# Patient Record
Sex: FEMALE | Race: BLACK OR AFRICAN AMERICAN | NOT HISPANIC OR LATINO | Employment: OTHER | URBAN - NONMETROPOLITAN AREA
[De-identification: names, ages, dates, MRNs, and addresses within clinical notes are randomized per-mention and may not be internally consistent; named-entity substitution may affect disease eponyms.]

---

## 2018-05-01 ENCOUNTER — HISTORICAL (OUTPATIENT)
Dept: ADMINISTRATIVE | Facility: HOSPITAL | Age: 73
End: 2018-05-01

## 2018-06-07 ENCOUNTER — HISTORICAL (OUTPATIENT)
Dept: ADMINISTRATIVE | Facility: HOSPITAL | Age: 73
End: 2018-06-07

## 2018-11-15 ENCOUNTER — HISTORICAL (OUTPATIENT)
Dept: ADMINISTRATIVE | Facility: HOSPITAL | Age: 73
End: 2018-11-15

## 2019-05-22 ENCOUNTER — HISTORICAL (OUTPATIENT)
Dept: ADMINISTRATIVE | Facility: HOSPITAL | Age: 74
End: 2019-05-22

## 2019-07-16 ENCOUNTER — HISTORICAL (OUTPATIENT)
Dept: ADMINISTRATIVE | Facility: HOSPITAL | Age: 74
End: 2019-07-16

## 2019-11-19 ENCOUNTER — HISTORICAL (OUTPATIENT)
Dept: ADMINISTRATIVE | Facility: HOSPITAL | Age: 74
End: 2019-11-19

## 2020-11-30 ENCOUNTER — HISTORICAL (OUTPATIENT)
Dept: ADMINISTRATIVE | Facility: HOSPITAL | Age: 75
End: 2020-11-30

## 2020-12-06 ENCOUNTER — HISTORICAL (OUTPATIENT)
Dept: ADMINISTRATIVE | Facility: HOSPITAL | Age: 75
End: 2020-12-06

## 2020-12-09 ENCOUNTER — HISTORICAL (OUTPATIENT)
Dept: ADMINISTRATIVE | Facility: HOSPITAL | Age: 75
End: 2020-12-09

## 2021-07-27 ENCOUNTER — HISTORICAL (OUTPATIENT)
Dept: ADMINISTRATIVE | Facility: HOSPITAL | Age: 76
End: 2021-07-27

## 2021-07-30 ENCOUNTER — HISTORICAL (OUTPATIENT)
Dept: ADMINISTRATIVE | Facility: HOSPITAL | Age: 76
End: 2021-07-30

## 2022-04-11 ENCOUNTER — HISTORICAL (OUTPATIENT)
Dept: ADMINISTRATIVE | Facility: HOSPITAL | Age: 77
End: 2022-04-11

## 2022-04-29 VITALS
SYSTOLIC BLOOD PRESSURE: 132 MMHG | DIASTOLIC BLOOD PRESSURE: 78 MMHG | HEIGHT: 64 IN | WEIGHT: 192.44 LBS | BODY MASS INDEX: 32.85 KG/M2

## 2022-05-07 ENCOUNTER — HISTORICAL (OUTPATIENT)
Dept: ADMINISTRATIVE | Facility: HOSPITAL | Age: 77
End: 2022-05-07

## 2022-09-14 ENCOUNTER — HISTORICAL (OUTPATIENT)
Dept: ADMINISTRATIVE | Facility: HOSPITAL | Age: 77
End: 2022-09-14

## 2023-04-21 ENCOUNTER — HOSPITAL ENCOUNTER (OUTPATIENT)
Dept: RADIOLOGY | Facility: HOSPITAL | Age: 78
Discharge: HOME | End: 2023-04-21
Attending: INTERNAL MEDICINE
Payer: MEDICARE

## 2023-04-21 DIAGNOSIS — M79.629: ICD-10-CM

## 2023-04-21 PROCEDURE — 76881 US COMPL JOINT R-T W/IMG: CPT | Mod: TC,RT

## 2023-06-08 DIAGNOSIS — D50.9 IRON DEFICIENCY ANEMIA, UNSPECIFIED IRON DEFICIENCY ANEMIA TYPE: Primary | ICD-10-CM

## 2023-06-08 RX ORDER — SODIUM CHLORIDE 0.9 % (FLUSH) 0.9 %
10 SYRINGE (ML) INJECTION
Status: CANCELLED | OUTPATIENT
Start: 2023-06-08

## 2023-06-12 DIAGNOSIS — D50.9 IRON DEFICIENCY ANEMIA, UNSPECIFIED IRON DEFICIENCY ANEMIA TYPE: Primary | ICD-10-CM

## 2023-06-12 RX ORDER — HEPARIN 100 UNIT/ML
500 SYRINGE INTRAVENOUS
Status: CANCELLED | OUTPATIENT
Start: 2023-06-12

## 2023-06-12 RX ORDER — SODIUM CHLORIDE 0.9 % (FLUSH) 0.9 %
10 SYRINGE (ML) INJECTION
Status: CANCELLED | OUTPATIENT
Start: 2023-06-12

## 2023-06-15 ENCOUNTER — INFUSION (OUTPATIENT)
Dept: INFUSION THERAPY | Facility: HOSPITAL | Age: 78
End: 2023-06-15
Attending: INTERNAL MEDICINE
Payer: MEDICARE

## 2023-06-15 VITALS
RESPIRATION RATE: 20 BRPM | WEIGHT: 192.44 LBS | BODY MASS INDEX: 32.85 KG/M2 | OXYGEN SATURATION: 96 % | DIASTOLIC BLOOD PRESSURE: 68 MMHG | HEART RATE: 87 BPM | HEIGHT: 64 IN | TEMPERATURE: 96 F | SYSTOLIC BLOOD PRESSURE: 128 MMHG

## 2023-06-15 DIAGNOSIS — D50.9 IRON DEFICIENCY ANEMIA, UNSPECIFIED IRON DEFICIENCY ANEMIA TYPE: Primary | ICD-10-CM

## 2023-06-15 PROCEDURE — 63600175 PHARM REV CODE 636 W HCPCS

## 2023-06-15 PROCEDURE — 25000003 PHARM REV CODE 250

## 2023-06-15 PROCEDURE — 96366 THER/PROPH/DIAG IV INF ADDON: CPT

## 2023-06-15 PROCEDURE — 96365 THER/PROPH/DIAG IV INF INIT: CPT

## 2023-06-15 RX ORDER — SODIUM CHLORIDE 0.9 % (FLUSH) 0.9 %
10 SYRINGE (ML) INJECTION
Status: CANCELLED | OUTPATIENT
Start: 2023-06-22

## 2023-06-15 RX ORDER — HEPARIN 100 UNIT/ML
500 SYRINGE INTRAVENOUS
Status: CANCELLED | OUTPATIENT
Start: 2023-06-22

## 2023-06-15 RX ORDER — SODIUM CHLORIDE 0.9 % (FLUSH) 0.9 %
10 SYRINGE (ML) INJECTION
Status: ACTIVE | OUTPATIENT
Start: 2023-06-15

## 2023-06-15 NOTE — PLAN OF CARE
INFUSION COMPLETE. IV D/C'D. PT INSTRUCTED TO CALL HER PMD WITH ANY QUESTIONS OR CONCERNS. PT VERBALIZED UNDERSTANDING.

## 2023-06-22 ENCOUNTER — INFUSION (OUTPATIENT)
Dept: INFUSION THERAPY | Facility: HOSPITAL | Age: 78
End: 2023-06-22
Attending: INTERNAL MEDICINE
Payer: MEDICARE

## 2023-06-22 VITALS
SYSTOLIC BLOOD PRESSURE: 122 MMHG | DIASTOLIC BLOOD PRESSURE: 65 MMHG | TEMPERATURE: 98 F | OXYGEN SATURATION: 97 % | RESPIRATION RATE: 20 BRPM | HEART RATE: 81 BPM

## 2023-06-22 DIAGNOSIS — D50.9 IRON DEFICIENCY ANEMIA, UNSPECIFIED IRON DEFICIENCY ANEMIA TYPE: Primary | ICD-10-CM

## 2023-06-22 PROCEDURE — A4216 STERILE WATER/SALINE, 10 ML: HCPCS | Performed by: INTERNAL MEDICINE

## 2023-06-22 PROCEDURE — 63600175 PHARM REV CODE 636 W HCPCS: Performed by: INTERNAL MEDICINE

## 2023-06-22 PROCEDURE — 96374 THER/PROPH/DIAG INJ IV PUSH: CPT

## 2023-06-22 PROCEDURE — 25000003 PHARM REV CODE 250: Performed by: INTERNAL MEDICINE

## 2023-06-22 RX ORDER — HEPARIN 100 UNIT/ML
500 SYRINGE INTRAVENOUS
Status: ACTIVE | OUTPATIENT
Start: 2023-06-22

## 2023-06-22 RX ORDER — SODIUM CHLORIDE 0.9 % (FLUSH) 0.9 %
10 SYRINGE (ML) INJECTION
Status: ACTIVE | OUTPATIENT
Start: 2023-06-22

## 2023-06-22 RX ORDER — HEPARIN 100 UNIT/ML
500 SYRINGE INTRAVENOUS
Status: CANCELLED | OUTPATIENT
Start: 2023-06-29

## 2023-06-22 RX ORDER — SODIUM CHLORIDE 0.9 % (FLUSH) 0.9 %
10 SYRINGE (ML) INJECTION
Status: CANCELLED | OUTPATIENT
Start: 2023-06-29

## 2023-06-22 RX ADMIN — IRON SUCROSE 200 MG: 20 INJECTION, SOLUTION INTRAVENOUS at 10:06

## 2023-06-22 RX ADMIN — Medication 10 ML: at 10:06

## 2023-06-22 NOTE — PLAN OF CARE
PT IN ROOM IN STABLE CONDITION, IRON SUCROSE 200MG IVP OVER 10 MINUTES GIVEN TO LT AC IV. PT TOLERATED WELL. PT STATES THAT THEY HAVE RECEIVED THIS MEDICATION IN THE PAST WITHOUT SIGNS AND SYMPTOMS OF A REACTION. INSTRUCTED PATIENT TO CALL PCP IF NEEDED.

## 2023-06-27 ENCOUNTER — HOSPITAL ENCOUNTER (EMERGENCY)
Facility: HOSPITAL | Age: 78
Discharge: HOME | End: 2023-06-27
Attending: FAMILY MEDICINE
Payer: MEDICARE

## 2023-06-27 VITALS
BODY MASS INDEX: 26.66 KG/M2 | HEART RATE: 82 BPM | WEIGHT: 160 LBS | OXYGEN SATURATION: 99 % | RESPIRATION RATE: 20 BRPM | DIASTOLIC BLOOD PRESSURE: 70 MMHG | TEMPERATURE: 98 F | HEIGHT: 65 IN | SYSTOLIC BLOOD PRESSURE: 147 MMHG

## 2023-06-27 DIAGNOSIS — R07.9 CHEST PAIN: ICD-10-CM

## 2023-06-27 DIAGNOSIS — M79.602 PAIN OF LEFT UPPER EXTREMITY: Primary | ICD-10-CM

## 2023-06-27 DIAGNOSIS — R52 PAIN: ICD-10-CM

## 2023-06-27 DIAGNOSIS — R53.1 WEAK: ICD-10-CM

## 2023-06-27 LAB
ALBUMIN SERPL-MCNC: 4.3 G/DL (ref 3.4–5)
ALBUMIN/GLOB SERPL: 1.4 RATIO
ALP SERPL-CCNC: 62 UNIT/L (ref 50–144)
ALT SERPL-CCNC: 43 UNIT/L (ref 1–45)
ANION GAP SERPL CALC-SCNC: 7 MEQ/L (ref 2–13)
AST SERPL-CCNC: 72 UNIT/L (ref 14–36)
BASOPHILS # BLD AUTO: 0.04 X10(3)/MCL (ref 0.01–0.08)
BASOPHILS NFR BLD AUTO: 0.7 % (ref 0.1–1.2)
BILIRUBIN DIRECT+TOT PNL SERPL-MCNC: 0.3 MG/DL (ref 0–1)
BUN SERPL-MCNC: 33 MG/DL (ref 7–20)
CALCIUM SERPL-MCNC: 8.8 MG/DL (ref 8.4–10.2)
CHLORIDE SERPL-SCNC: 105 MMOL/L (ref 98–110)
CK MB SERPL-MCNC: 0.86 NG/ML (ref 0–3.38)
CK SERPL-CCNC: 137 U/L (ref 30–135)
CO2 SERPL-SCNC: 24 MMOL/L (ref 21–32)
CREAT SERPL-MCNC: 1.42 MG/DL (ref 0.66–1.25)
CREAT/UREA NIT SERPL: 23 (ref 12–20)
EOSINOPHIL # BLD AUTO: 0.07 X10(3)/MCL (ref 0.04–0.36)
EOSINOPHIL NFR BLD AUTO: 1.2 % (ref 0.7–7)
ERYTHROCYTE [DISTWIDTH] IN BLOOD BY AUTOMATED COUNT: 13.5 % (ref 11–14.5)
GFR SERPLBLD CREATININE-BSD FMLA CKD-EPI: 39 MLS/MIN/1.73/M2
GLOBULIN SER-MCNC: 3.1 GM/DL (ref 2–3.9)
GLUCOSE SERPL-MCNC: 279 MG/DL (ref 70–115)
HCT VFR BLD AUTO: 27.6 % (ref 36–48)
HGB BLD-MCNC: 8.5 G/DL (ref 11.8–16)
IMM GRANULOCYTES # BLD AUTO: 0.02 X10(3)/MCL (ref 0–0.03)
IMM GRANULOCYTES NFR BLD AUTO: 0.3 % (ref 0–0.5)
LYMPHOCYTES # BLD AUTO: 1.58 X10(3)/MCL (ref 1.16–3.74)
LYMPHOCYTES NFR BLD AUTO: 27.1 % (ref 20–55)
MCH RBC QN AUTO: 28.8 PG (ref 27–34)
MCHC RBC AUTO-ENTMCNC: 30.8 G/DL (ref 31–37)
MCV RBC AUTO: 93.6 FL (ref 79–99)
MONOCYTES # BLD AUTO: 0.45 X10(3)/MCL (ref 0.24–0.36)
MONOCYTES NFR BLD AUTO: 7.7 % (ref 4.7–12.5)
NEUTROPHILS # BLD AUTO: 3.66 X10(3)/MCL (ref 1.56–6.13)
NEUTROPHILS NFR BLD AUTO: 63 % (ref 37–73)
NRBC BLD AUTO-RTO: 0 %
PLATELET # BLD AUTO: 202 X10(3)/MCL (ref 140–371)
PMV BLD AUTO: 10.6 FL (ref 9.4–12.4)
POTASSIUM SERPL-SCNC: 4.7 MMOL/L (ref 3.5–5.1)
PROT SERPL-MCNC: 7.4 GM/DL (ref 6.3–8.2)
RBC # BLD AUTO: 2.95 X10(6)/MCL (ref 4–5.1)
SODIUM SERPL-SCNC: 136 MMOL/L (ref 135–145)
TROPONIN I SERPL-MCNC: <0.012 NG/ML (ref 0–0.03)
WBC # SPEC AUTO: 5.82 X10(3)/MCL (ref 4–11.5)

## 2023-06-27 PROCEDURE — 99285 EMERGENCY DEPT VISIT HI MDM: CPT | Mod: 25

## 2023-06-27 PROCEDURE — 85025 COMPLETE CBC W/AUTO DIFF WBC: CPT | Performed by: FAMILY MEDICINE

## 2023-06-27 PROCEDURE — 82553 CREATINE MB FRACTION: CPT | Performed by: FAMILY MEDICINE

## 2023-06-27 PROCEDURE — 36415 COLL VENOUS BLD VENIPUNCTURE: CPT | Performed by: FAMILY MEDICINE

## 2023-06-27 PROCEDURE — 84484 ASSAY OF TROPONIN QUANT: CPT | Performed by: FAMILY MEDICINE

## 2023-06-27 PROCEDURE — 93005 ELECTROCARDIOGRAM TRACING: CPT

## 2023-06-27 PROCEDURE — 25000003 PHARM REV CODE 250: Performed by: FAMILY MEDICINE

## 2023-06-27 PROCEDURE — 82550 ASSAY OF CK (CPK): CPT | Performed by: FAMILY MEDICINE

## 2023-06-27 PROCEDURE — 80053 COMPREHEN METABOLIC PANEL: CPT | Performed by: FAMILY MEDICINE

## 2023-06-27 RX ORDER — IBUPROFEN 400 MG/1
400 TABLET ORAL
Status: COMPLETED | OUTPATIENT
Start: 2023-06-27 | End: 2023-06-27

## 2023-06-27 RX ADMIN — IBUPROFEN 400 MG: 400 TABLET ORAL at 01:06

## 2023-06-27 NOTE — ED PROVIDER NOTES
Encounter Date: 6/27/2023       History     Chief Complaint   Patient presents with    Arm Pain     AMB TO ED WITH C/O LEFT ARM PAIN /PRESSURE SINCE YESTERDAY. SHE HAS SOME FACIAL ASYMMETRY TO HER FACE AND SHE HAS A HX OF A CVA X 5YRS AGO. THE PT LOOKED IN THE MIRROR AND DOES NOT SEE ANY CHANGES.      Patient presents to the emergency room with around a 1 day history of some aches in her left upper arm.  It has been constant, but the patient notices maybe worse with movement.  She denies any chest pains or feeling bad, but she is not sure if she twisted it or bruised it.  She called her PCP and was referred to the emergency room.  Patient denies any chest pains fevers chills etc. Nursing triage noted that patient had a slight facial droop on the left side.  Patient says this is normal for her since a stroke 5 years ago.    The history is provided by the patient.   Review of patient's allergies indicates:  No Known Allergies  Past Medical History:   Diagnosis Date    Cancer     Diabetes mellitus     High cholesterol     Hypertension     Stroke      Past Surgical History:   Procedure Laterality Date    BREAST SURGERY      COLON SURGERY      COLONOSCOPY  10/05/2016     History reviewed. No pertinent family history.  Social History     Tobacco Use    Smoking status: Former     Types: Cigarettes    Smokeless tobacco: Never   Substance Use Topics    Alcohol use: Never     Review of Systems   Constitutional:  Negative for fever.   HENT:  Negative for sore throat.    Respiratory:  Negative for shortness of breath.    Cardiovascular:  Negative for chest pain.   Gastrointestinal:  Negative for nausea.   Genitourinary:  Negative for dysuria.   Musculoskeletal:  Negative for back pain.   Skin:  Negative for rash.   Neurological:  Negative for weakness.   Hematological:  Does not bruise/bleed easily.   All other systems reviewed and are negative.    Physical Exam     Initial Vitals [06/27/23 1143]   BP Pulse Resp Temp SpO2    137/60 99 20 98.4 °F (36.9 °C) 100 %      MAP       --         Physical Exam    Nursing note and vitals reviewed.  Constitutional: She appears well-developed and well-nourished.   HENT:   Head: Normocephalic and atraumatic.   Eyes: EOM are normal. Pupils are equal, round, and reactive to light.   Neck: Neck supple.   Normal range of motion.  Cardiovascular:  Normal rate, regular rhythm and normal heart sounds.           Pulmonary/Chest: Breath sounds normal.   Abdominal: Abdomen is soft. Bowel sounds are normal. There is no abdominal tenderness.   Musculoskeletal:         General: No edema. Normal range of motion.      Cervical back: Normal range of motion and neck supple.     Neurological: She is alert and oriented to person, place, and time. She has normal strength and normal reflexes. She displays normal reflexes. No sensory deficit. GCS score is 15. GCS eye subscore is 4. GCS verbal subscore is 5. GCS motor subscore is 6.   Patient does have a slight left-sided facial droop noted primarily around maxillary area, but it was very subtle   Skin: Skin is warm and dry. Capillary refill takes less than 2 seconds.   Psychiatric: She has a normal mood and affect.     ED Course   Procedures  Labs Reviewed   CK - Abnormal; Notable for the following components:       Result Value    Creatine Kinase 137 (*)     All other components within normal limits   COMPREHENSIVE METABOLIC PANEL - Abnormal; Notable for the following components:    Glucose Level 279 (*)     Blood Urea Nitrogen 33.0 (*)     Creatinine 1.42 (*)     Aspartate Aminotransferase 72 (*)     BUN/Creatinine Ratio 23 (*)     All other components within normal limits   CBC WITH DIFFERENTIAL - Abnormal; Notable for the following components:    RBC 2.95 (*)     Hgb 8.5 (*)     Hct 27.6 (*)     MCHC 30.8 (*)     Mono # 0.45 (*)     All other components within normal limits   TROPONIN I - Normal   CK-MB - Normal   CBC W/ AUTO DIFFERENTIAL    Narrative:     The  following orders were created for panel order CBC Auto Differential.  Procedure                               Abnormality         Status                     ---------                               -----------         ------                     CBC with Differential[[id number]]        Abnormal            Final result                 Please view results for these tests on the individual orders.     EKG Readings: (Independently Interpreted)   Initial Reading: No STEMI. Rhythm: Normal Sinus Rhythm. Ectopy: No Ectopy. Conduction: Normal. ST Segments: Normal ST Segments. T Waves: Normal. Clinical Impression: Normal Sinus Rhythm     Imaging Results              X-Ray Chest AP Portable (Final result)  Result time 06/27/23 13:34:05      Final result by Chava Jimenez III, MD (06/27/23 13:34:05)                   Impression:      1. Chronic changes are present as described above.  See above comments.  2. I see no lobar or segmental infiltrates or other significant abnormalities.      Electronically signed by: Chava Jimenez  Date:    06/27/2023  Time:    13:34               Narrative:    EXAMINATION:  STUDY: XR CHEST AP PORTABLE    CLINICAL HISTORY AND TECHNIQUE:  RT Bill on 6/27/2023  1:27 PM    CURRENT CLINICAL HISTORY: ER PT    X 1 DAY    -LEFT UPPER ARM PAIN/PRESSURE    -WEAKNESS    PAST MEDICAL HISTORY: CVA, FORMER SMOKER, HTN, DIABETES    TECHNIQUE: 1 VIEW CHEST PORTABLE    TECHNOLOGIST: HUMZA/MD/NN    COMPARISON:  12/09/2020    FINDINGS:  The lungs are slightly under expanded.  Postoperative changes are noted within the right axilla and breast.The cardiac, hilar, and mediastinal contours appear unremarkable.I see no lobar or segmental infiltrates.No significant pleural effusions are noted.There is moderate demineralization of the skeletal structures with moderate degenerative changes noted involving both glenohumeral joints and to a lesser extent the thoracic spine.                                       X-Ray Humerus  2 View Left (Final result)  Result time 06/27/23 13:35:19      Final result by Chava Jimenez III, MD (06/27/23 13:35:19)                   Impression:      1. Chronic changes are present as described above.  See above comments.      Electronically signed by: Chava Jimenez  Date:    06/27/2023  Time:    13:35               Narrative:    EXAMINATION:  STUDY: XR HUMERUS 2 VIEW LEFT    CLINICAL HISTORY AND TECHNIQUE:  Pancho Urban RT on 6/27/2023  1:27 PM    CURRENT CLINICAL HISTORY: ER PT    X 1 DAY    -LEFT UPPER ARM PAIN/PRESSURE    -WEAKNESS    PAST MEDICAL HISTORY: CVA, FORMER SMOKER, HTN, DIABETES    TECHNIQUE: 2 VIEWS OF LEFT HUMERUS    TECHNOLOGIST: HUMZA/MD/NN    COMPARISON:  None    FINDINGS:  There is moderate demineralization of the skeletal structures with fairly extensive degenerative changes noted involving the left acromioclavicular joint and to a lesser extent the left glenohumeral joint.  I see no acute fractures or other significant abnormalities.                                    X-Rays:   Independently Interpreted Readings:   Other Readings:  Left arm: no acute    CXR: no acute  Medications   ibuprofen tablet 400 mg (400 mg Oral Given 6/27/23 1301)     Medical Decision Making:   Initial Assessment:   Left arm ache  Differential Diagnosis:   ACS, muscular strain  Clinical Tests:   Lab Tests: Ordered and Reviewed  The following lab test(s) were unremarkable: CBC, CMP and Troponin  Radiological Study: Ordered and Reviewed  Medical Tests: Ordered and Reviewed  ED Management:  EKG normal, x-rays are normal, lab work all normal    Most likely diagnosis is musculoskeletal pain/arthritis we will discharge                        Clinical Impression:   Final diagnoses:  [R07.9] Chest pain  [R53.1] Weak  [R52] Pain  [M79.602] Pain of left upper extremity (Primary)        ED Disposition Condition    Discharge Stable          ED Prescriptions    None       Follow-up Information       Follow up With Specialties  Details Why Contact Info    Sylvain Ackerman MD Internal Medicine, General Practice  If symptoms worsen [address] [facility] [address]  [facility] [id number] [phone]               Michael Lee MD  06/27/23 0210

## 2023-06-27 NOTE — DISCHARGE INSTRUCTIONS
Take Tylenol or Motrin as needed for aches and pains.  Feel free to return to the emergency room with chest pains weakness worsening of pain.

## 2023-06-29 ENCOUNTER — INFUSION (OUTPATIENT)
Dept: INFUSION THERAPY | Facility: HOSPITAL | Age: 78
End: 2023-06-29
Attending: INTERNAL MEDICINE
Payer: MEDICAID

## 2023-06-29 VITALS
OXYGEN SATURATION: 97 % | RESPIRATION RATE: 18 BRPM | HEART RATE: 80 BPM | TEMPERATURE: 98 F | DIASTOLIC BLOOD PRESSURE: 78 MMHG | SYSTOLIC BLOOD PRESSURE: 132 MMHG

## 2023-06-29 DIAGNOSIS — D50.9 IRON DEFICIENCY ANEMIA, UNSPECIFIED IRON DEFICIENCY ANEMIA TYPE: Primary | ICD-10-CM

## 2023-06-29 PROCEDURE — 96374 THER/PROPH/DIAG INJ IV PUSH: CPT

## 2023-06-29 PROCEDURE — 25000003 PHARM REV CODE 250: Performed by: INTERNAL MEDICINE

## 2023-06-29 PROCEDURE — 63600175 PHARM REV CODE 636 W HCPCS: Performed by: INTERNAL MEDICINE

## 2023-06-29 PROCEDURE — 96376 TX/PRO/DX INJ SAME DRUG ADON: CPT

## 2023-06-29 PROCEDURE — A4216 STERILE WATER/SALINE, 10 ML: HCPCS | Performed by: INTERNAL MEDICINE

## 2023-06-29 RX ORDER — SODIUM CHLORIDE 0.9 % (FLUSH) 0.9 %
10 SYRINGE (ML) INJECTION
Status: CANCELLED | OUTPATIENT
Start: 2023-07-06

## 2023-06-29 RX ORDER — SODIUM CHLORIDE 0.9 % (FLUSH) 0.9 %
10 SYRINGE (ML) INJECTION
Status: ACTIVE | OUTPATIENT
Start: 2023-06-29

## 2023-06-29 RX ORDER — HEPARIN 100 UNIT/ML
500 SYRINGE INTRAVENOUS
Status: CANCELLED | OUTPATIENT
Start: 2023-07-06

## 2023-06-29 RX ADMIN — Medication 10 ML: at 09:06

## 2023-06-29 RX ADMIN — IRON SUCROSE 200 MG: 20 INJECTION, SOLUTION INTRAVENOUS at 09:06

## 2023-06-29 NOTE — PLAN OF CARE
PT IN ROOM IN STABLE CONDITION, VENOFER  GIVEN VIA IVP OVER 10 MINUTES AS INSTRUCTED PER PHARMACY.  IV FLUSHED WITH NS 20MLS, IV D/C'D WITH CATHETER TIP INTACT, APPLIED PRESSURE THEN COVERED WITH BAND-AID. PT TOLERATED WELL. INSTRUCTED PATIENT TO CALL PCP IF NEEDED.

## 2023-07-06 ENCOUNTER — INFUSION (OUTPATIENT)
Dept: INFUSION THERAPY | Facility: HOSPITAL | Age: 78
End: 2023-07-06
Attending: INTERNAL MEDICINE
Payer: MEDICARE

## 2023-07-06 VITALS
RESPIRATION RATE: 20 BRPM | SYSTOLIC BLOOD PRESSURE: 139 MMHG | OXYGEN SATURATION: 100 % | DIASTOLIC BLOOD PRESSURE: 68 MMHG | TEMPERATURE: 98 F | HEART RATE: 79 BPM

## 2023-07-06 DIAGNOSIS — D50.9 IRON DEFICIENCY ANEMIA, UNSPECIFIED IRON DEFICIENCY ANEMIA TYPE: Primary | ICD-10-CM

## 2023-07-06 PROCEDURE — A4216 STERILE WATER/SALINE, 10 ML: HCPCS | Performed by: INTERNAL MEDICINE

## 2023-07-06 PROCEDURE — 63600175 PHARM REV CODE 636 W HCPCS: Performed by: INTERNAL MEDICINE

## 2023-07-06 PROCEDURE — 25000003 PHARM REV CODE 250: Performed by: INTERNAL MEDICINE

## 2023-07-06 PROCEDURE — 96374 THER/PROPH/DIAG INJ IV PUSH: CPT

## 2023-07-06 RX ORDER — SODIUM CHLORIDE 0.9 % (FLUSH) 0.9 %
10 SYRINGE (ML) INJECTION
Status: ACTIVE | OUTPATIENT
Start: 2023-07-06

## 2023-07-06 RX ORDER — SODIUM CHLORIDE 0.9 % (FLUSH) 0.9 %
10 SYRINGE (ML) INJECTION
Status: CANCELLED | OUTPATIENT
Start: 2023-07-13

## 2023-07-06 RX ORDER — HEPARIN 100 UNIT/ML
500 SYRINGE INTRAVENOUS
Status: CANCELLED | OUTPATIENT
Start: 2023-07-13

## 2023-07-06 RX ADMIN — Medication 10 ML: at 10:07

## 2023-07-06 RX ADMIN — IRON SUCROSE 200 MG: 20 INJECTION, SOLUTION INTRAVENOUS at 10:07

## 2023-07-06 NOTE — PLAN OF CARE
Patient tolerated iron well, No s/s of distress noted, Instructed patient to follow up with her physician as needed, Verbalized understanding, Patient discharged home in stable condition

## 2023-07-13 ENCOUNTER — INFUSION (OUTPATIENT)
Dept: INFUSION THERAPY | Facility: HOSPITAL | Age: 78
End: 2023-07-13
Attending: INTERNAL MEDICINE
Payer: MEDICAID

## 2023-07-13 VITALS
HEART RATE: 81 BPM | SYSTOLIC BLOOD PRESSURE: 156 MMHG | TEMPERATURE: 97 F | OXYGEN SATURATION: 100 % | RESPIRATION RATE: 20 BRPM | DIASTOLIC BLOOD PRESSURE: 61 MMHG

## 2023-07-13 DIAGNOSIS — D50.9 IRON DEFICIENCY ANEMIA, UNSPECIFIED IRON DEFICIENCY ANEMIA TYPE: Primary | ICD-10-CM

## 2023-07-13 PROCEDURE — 63600175 PHARM REV CODE 636 W HCPCS: Performed by: INTERNAL MEDICINE

## 2023-07-13 PROCEDURE — 96374 THER/PROPH/DIAG INJ IV PUSH: CPT

## 2023-07-13 RX ORDER — HEPARIN 100 UNIT/ML
500 SYRINGE INTRAVENOUS
OUTPATIENT
Start: 2023-07-20

## 2023-07-13 RX ORDER — HEPARIN 100 UNIT/ML
500 SYRINGE INTRAVENOUS
Status: ACTIVE | OUTPATIENT
Start: 2023-07-13

## 2023-07-13 RX ORDER — SODIUM CHLORIDE 0.9 % (FLUSH) 0.9 %
10 SYRINGE (ML) INJECTION
OUTPATIENT
Start: 2023-07-20

## 2023-07-13 RX ORDER — SODIUM CHLORIDE 0.9 % (FLUSH) 0.9 %
10 SYRINGE (ML) INJECTION
Status: ACTIVE | OUTPATIENT
Start: 2023-07-13

## 2023-07-13 RX ADMIN — IRON SUCROSE 200 MG: 20 INJECTION, SOLUTION INTRAVENOUS at 10:07

## 2023-07-13 NOTE — PLAN OF CARE
PT IN ROOM IN STABLE CONDITION, VENOFER GIVEN VIA IVP. IV FLUSHED WITH 20ML NS, IV D/C'D WITH CATHETER TIP INTACT, APPLIED PRESSURE THEN COVERED WITH BAND-AID. PT TOLERATED WELL. INSTRUCTED PATIENT TO CALL PCP IF NEEDED.

## 2023-07-18 ENCOUNTER — OFFICE VISIT (OUTPATIENT)
Dept: OBSTETRICS AND GYNECOLOGY | Facility: CLINIC | Age: 78
End: 2023-07-18
Payer: MEDICARE

## 2023-07-18 VITALS
TEMPERATURE: 98 F | BODY MASS INDEX: 28.89 KG/M2 | HEIGHT: 65 IN | WEIGHT: 173.38 LBS | DIASTOLIC BLOOD PRESSURE: 74 MMHG | SYSTOLIC BLOOD PRESSURE: 148 MMHG

## 2023-07-18 DIAGNOSIS — R32 URINARY INCONTINENCE, UNSPECIFIED TYPE: Primary | ICD-10-CM

## 2023-07-18 DIAGNOSIS — R35.1 NOCTURIA: ICD-10-CM

## 2023-07-18 DIAGNOSIS — N81.11 CYSTOCELE, MIDLINE: ICD-10-CM

## 2023-07-18 LAB
BILIRUB UR QL STRIP: NEGATIVE
GLUCOSE UR QL STRIP: NEGATIVE
KETONES UR QL STRIP: NEGATIVE
LEUKOCYTE ESTERASE UR QL STRIP: NEGATIVE
PH, POC UA: 5
POC BLOOD, URINE: NEGATIVE
POC NITRATES, URINE: NEGATIVE
PROT UR QL STRIP: POSITIVE
SP GR UR STRIP: 1.02 (ref 1–1.03)
UROBILINOGEN UR STRIP-ACNC: 0.2 (ref 0.1–1.1)

## 2023-07-18 PROCEDURE — 99213 PR OFFICE/OUTPT VISIT, EST, LEVL III, 20-29 MIN: ICD-10-PCS | Mod: ,,, | Performed by: OBSTETRICS & GYNECOLOGY

## 2023-07-18 PROCEDURE — 81003 URINALYSIS AUTO W/O SCOPE: CPT | Mod: QW,RHCUB | Performed by: OBSTETRICS & GYNECOLOGY

## 2023-07-18 PROCEDURE — 99213 OFFICE O/P EST LOW 20 MIN: CPT | Mod: ,,, | Performed by: OBSTETRICS & GYNECOLOGY

## 2023-07-18 PROCEDURE — 1159F PR MEDICATION LIST DOCUMENTED IN MEDICAL RECORD: ICD-10-PCS | Mod: ,,, | Performed by: OBSTETRICS & GYNECOLOGY

## 2023-07-18 PROCEDURE — 1159F MED LIST DOCD IN RCRD: CPT | Mod: ,,, | Performed by: OBSTETRICS & GYNECOLOGY

## 2023-07-18 PROCEDURE — 3077F SYST BP >= 140 MM HG: CPT | Mod: ,,, | Performed by: OBSTETRICS & GYNECOLOGY

## 2023-07-18 PROCEDURE — 1101F PR PT FALLS ASSESS DOC 0-1 FALLS W/OUT INJ PAST YR: ICD-10-PCS | Mod: ,,, | Performed by: OBSTETRICS & GYNECOLOGY

## 2023-07-18 PROCEDURE — 3078F PR MOST RECENT DIASTOLIC BLOOD PRESSURE < 80 MM HG: ICD-10-PCS | Mod: ,,, | Performed by: OBSTETRICS & GYNECOLOGY

## 2023-07-18 PROCEDURE — 1101F PT FALLS ASSESS-DOCD LE1/YR: CPT | Mod: ,,, | Performed by: OBSTETRICS & GYNECOLOGY

## 2023-07-18 PROCEDURE — 3078F DIAST BP <80 MM HG: CPT | Mod: ,,, | Performed by: OBSTETRICS & GYNECOLOGY

## 2023-07-18 PROCEDURE — 3288F FALL RISK ASSESSMENT DOCD: CPT | Mod: ,,, | Performed by: OBSTETRICS & GYNECOLOGY

## 2023-07-18 PROCEDURE — 3077F PR MOST RECENT SYSTOLIC BLOOD PRESSURE >= 140 MM HG: ICD-10-PCS | Mod: ,,, | Performed by: OBSTETRICS & GYNECOLOGY

## 2023-07-18 PROCEDURE — 3288F PR FALLS RISK ASSESSMENT DOCUMENTED: ICD-10-PCS | Mod: ,,, | Performed by: OBSTETRICS & GYNECOLOGY

## 2023-07-18 RX ORDER — HYDROCHLOROTHIAZIDE 25 MG/1
25 TABLET ORAL
COMMUNITY
Start: 2023-06-16

## 2023-07-18 RX ORDER — SUCRALFATE 1 G/1
1 TABLET ORAL
COMMUNITY

## 2023-07-18 RX ORDER — ESOMEPRAZOLE MAGNESIUM 40 MG/1
40 CAPSULE, DELAYED RELEASE ORAL 2 TIMES DAILY
COMMUNITY
Start: 2023-06-16

## 2023-07-18 RX ORDER — CALCIUM CITRATE/VITAMIN D3 200MG-6.25
TABLET ORAL
COMMUNITY
Start: 2023-02-28

## 2023-07-18 RX ORDER — PANTOPRAZOLE SODIUM 40 MG/1
40 TABLET, DELAYED RELEASE ORAL 2 TIMES DAILY
COMMUNITY
Start: 2023-05-23

## 2023-07-18 RX ORDER — PIOGLITAZONEHYDROCHLORIDE 45 MG/1
45 TABLET ORAL
COMMUNITY
Start: 2023-06-16

## 2023-07-18 RX ORDER — METFORMIN HYDROCHLORIDE 1000 MG/1
1000 TABLET ORAL 2 TIMES DAILY
COMMUNITY
Start: 2023-07-01

## 2023-07-18 RX ORDER — ROSUVASTATIN CALCIUM 40 MG/1
40 TABLET, COATED ORAL
COMMUNITY
Start: 2023-06-08

## 2023-07-18 RX ORDER — SOLIFENACIN SUCCINATE 5 MG/1
5 TABLET, FILM COATED ORAL DAILY
Qty: 30 TABLET | Refills: 11 | Status: SHIPPED | OUTPATIENT
Start: 2023-07-18 | End: 2024-07-17

## 2023-07-18 RX ORDER — GLIPIZIDE 10 MG/1
10 TABLET, FILM COATED, EXTENDED RELEASE ORAL 2 TIMES DAILY
COMMUNITY
Start: 2023-04-05

## 2023-07-18 RX ORDER — AMLODIPINE BESYLATE 10 MG/1
10 TABLET ORAL
COMMUNITY
Start: 2023-06-16

## 2023-07-18 RX ORDER — TRIAMCINOLONE ACETONIDE 1 MG/G
CREAM TOPICAL
COMMUNITY
Start: 2023-04-04

## 2023-07-18 RX ORDER — HYDROCODONE BITARTRATE AND ACETAMINOPHEN 10; 325 MG/1; MG/1
TABLET ORAL
COMMUNITY
Start: 2023-06-23

## 2023-07-18 NOTE — PROGRESS NOTES
Chief Complaint     Urinary Incontinence    HPI:     Patient is a 77 y.o.  s/p hysterectomy presents today with complaints of continued leakage of bladder, nocturia. No relief with Oxybutynin and minimal relief with Myrbetriq in past.  Still not using vaginal premarin. Denies sexually active. Reports no change with cystocele, denies pain.      MENOPAUSAL:  Age at menarche: 14  Age at menopause: MARY  Hysterectomy:  Yes  Last pap: MARY  H/o Abnormal Pap: No   Colposcopy: none  Postcoital Bleeding: No  Sexually Active: not currently   Dyspareunia: No  H/o STI: No   HRT: none  MM2022 L breast  H/o abnl MMG: hx of breast cancer. R mast    Past Medical History:   Diagnosis Date    Cancer     Diabetes mellitus     High cholesterol     Hypertension     Stroke        Past Surgical History:   Procedure Laterality Date    BREAST SURGERY      COLON SURGERY      COLONOSCOPY  10/05/2016    HYSTERECTOMY         History reviewed. No pertinent family history.    OB History          3    Para   3    Term   3            AB        Living   3         SAB        IAB        Ectopic        Multiple        Live Births   3                 Current Outpatient Medications on File Prior to Visit   Medication Sig Dispense Refill    amLODIPine (NORVASC) 10 MG tablet Take 10 mg by mouth.      esomeprazole (NEXIUM) 40 MG capsule Take 40 mg by mouth 2 (two) times daily.      glipiZIDE (GLUCOTROL) 10 MG TR24 Take 10 mg by mouth 2 (two) times daily.      hydroCHLOROthiazide (HYDRODIURIL) 25 MG tablet Take 25 mg by mouth.      HYDROcodone-acetaminophen (NORCO)  mg per tablet TAKE 1 TABLET 3 TIMES DAILY AS NEEDED FOR PAIN --MAY CAUSE DROWSINESS--      metFORMIN (GLUCOPHAGE) 1000 MG tablet Take 1,000 mg by mouth 2 (two) times daily.      pantoprazole (PROTONIX) 40 MG tablet Take 40 mg by mouth 2 (two) times daily.      pioglitazone (ACTOS) 45 MG tablet Take 45 mg by mouth.      rosuvastatin (CRESTOR) 40 MG Tab Take 40  mg by mouth.      sucralfate (CARAFATE) 1 gram tablet Take 1 g by mouth.      triamcinolone acetonide 0.1% (KENALOG) 0.1 % cream APPLY TO AFFECTED AREA TWO TIMES A DAY      TRUE METRIX GLUCOSE TEST STRIP Strp USE 1 STRIP TO TEST 3 TIMES A DAY AS DIRECTED       Current Facility-Administered Medications on File Prior to Visit   Medication Dose Route Frequency Provider Last Rate Last Admin    heparin, porcine (PF) 100 unit/mL injection flush 500 Units  500 Units Intravenous PRN Sylvain Ackerman MD        heparin, porcine (PF) 100 unit/mL injection flush 500 Units  500 Units Intravenous PRN Sylvain Ackerman MD        sodium chloride 0.9% 250 mL flush bag   Intravenous PRN Sylvain Ackerman MD        sodium chloride 0.9% 250 mL flush bag   Intravenous PRN Sylvain Ackerman MD        sodium chloride 0.9% 250 mL flush bag   Intravenous PRN Sylvain Ackerman MD        sodium chloride 0.9% 250 mL flush bag   Intravenous PRN Sylvain Ackerman MD        sodium chloride 0.9% 250 mL flush bag   Intravenous PRN Sylvain Ackerman MD        sodium chloride 0.9% flush 10 mL  10 mL Intravenous PRN Sylvain Ackerman MD        sodium chloride 0.9% flush 10 mL  10 mL Intravenous PRN Sylvain Ackerman MD   10 mL at 06/22/23 1028    sodium chloride 0.9% flush 10 mL  10 mL Intravenous PRN Sylvain Ackerman MD   10 mL at 06/29/23 0951    sodium chloride 0.9% flush 10 mL  10 mL Intravenous PRN Sylvain Ackerman MD   10 mL at 07/06/23 1007    sodium chloride 0.9% flush 10 mL  10 mL Intravenous PRN Sylvain Ackerman MD           Review of Systems:       Review of Systems   Constitutional:  Negative for chills and fever.   Gastrointestinal:  Negative for abdominal pain, constipation and diarrhea.   Genitourinary:  Positive for bladder incontinence, frequency, urgency and urinary incontinence. Negative for decreased libido, dysmenorrhea, dyspareunia, dysuria, flank pain, genital sores, hematuria, hot flashes, menorrhagia, menstrual problem, pelvic pain,  "vaginal bleeding, vaginal discharge, vaginal pain, postcoital bleeding, postmenopausal bleeding, vaginal dryness and vaginal odor.        Prolapse       Physical Exam:    BP (!) 148/74 (BP Location: Right arm)   Temp 97.5 °F (36.4 °C)   Ht 5' 5" (1.651 m)   Wt 78.7 kg (173 lb 6.4 oz)   BMI 28.86 kg/m²     Physical Exam     Deferred    Assessment:   1. Urinary incontinence, unspecified type  -     POCT Urinalysis, Dipstick, Automated, W/O Scope  -     solifenacin (VESICARE) 5 MG tablet; Take 1 tablet (5 mg total) by mouth once daily.  Dispense: 30 tablet; Refill: 11    2. Nocturia    3. Cystocele, midline             Plan:       Discussed treatment options including conservative measures, pessary insertion, surgical repair.   Strongly recommend use of vaginal premarin cream to help with atrophy incase she wants to pursue surgery or pessary in the future.  Discussed A&P repair procedure in detail with patient. Questions answered.     Says she wants to try another medication fo rurgency for now.   Will do trial of vesicare 5 mg daily      RTC 1 month for medication f/u    "

## 2023-07-25 PROBLEM — N81.11 CYSTOCELE, MIDLINE: Status: ACTIVE | Noted: 2023-07-25

## 2023-07-25 PROBLEM — R32 URINARY INCONTINENCE: Status: ACTIVE | Noted: 2023-07-25

## 2023-08-10 NOTE — PROGRESS NOTES
Chief Complaint     Follow-up (F/u on vesicare 5 mg. )    HPI:     Patient is a 77 y.o.  presents to follow up Vesicare 5mg for complaints of urinary incontinence, nocturia. She's had partial relief of symptoms with vesicare.     MENOPAUSAL:  Age at menarche: 14  Age at menopause: MARY  Hysterectomy:  Yes  Last pap: MARY  H/o Abnormal Pap: No   Colposcopy: none  Postcoital Bleeding: No  Sexually Active: not currently   Dyspareunia: No  H/o STI: No   HRT: none  MM2022 L breast  H/o abnl MMG: hx of breast cancer. R mastectomy   Colonoscopy: yes        Past Medical History:   Diagnosis Date    Cancer     Diabetes mellitus     High cholesterol     Hypertension     Stroke        Past Surgical History:   Procedure Laterality Date    BREAST SURGERY      COLON SURGERY      COLONOSCOPY  10/05/2016    HYSTERECTOMY         History reviewed. No pertinent family history.    OB History          3    Para   3    Term   3            AB        Living   3         SAB        IAB        Ectopic        Multiple        Live Births   3                 Current Outpatient Medications on File Prior to Visit   Medication Sig Dispense Refill    amLODIPine (NORVASC) 10 MG tablet Take 10 mg by mouth.      esomeprazole (NEXIUM) 40 MG capsule Take 40 mg by mouth 2 (two) times daily.      glipiZIDE (GLUCOTROL) 10 MG TR24 Take 10 mg by mouth 2 (two) times daily.      hydroCHLOROthiazide (HYDRODIURIL) 25 MG tablet Take 25 mg by mouth.      HYDROcodone-acetaminophen (NORCO)  mg per tablet TAKE 1 TABLET 3 TIMES DAILY AS NEEDED FOR PAIN --MAY CAUSE DROWSINESS--      metFORMIN (GLUCOPHAGE) 1000 MG tablet Take 1,000 mg by mouth 2 (two) times daily.      pantoprazole (PROTONIX) 40 MG tablet Take 40 mg by mouth 2 (two) times daily.      pioglitazone (ACTOS) 45 MG tablet Take 45 mg by mouth.      rosuvastatin (CRESTOR) 40 MG Tab Take 40 mg by mouth.      solifenacin (VESICARE) 5 MG tablet Take 1 tablet (5 mg total) by  mouth once daily. 30 tablet 11    sucralfate (CARAFATE) 1 gram tablet Take 1 g by mouth.      triamcinolone acetonide 0.1% (KENALOG) 0.1 % cream APPLY TO AFFECTED AREA TWO TIMES A DAY      TRUE METRIX GLUCOSE TEST STRIP Strp USE 1 STRIP TO TEST 3 TIMES A DAY AS DIRECTED       Current Facility-Administered Medications on File Prior to Visit   Medication Dose Route Frequency Provider Last Rate Last Admin    heparin, porcine (PF) 100 unit/mL injection flush 500 Units  500 Units Intravenous PRN Sylvain Ackerman MD        heparin, porcine (PF) 100 unit/mL injection flush 500 Units  500 Units Intravenous PRN Sylvain Ackerman MD        sodium chloride 0.9% 250 mL flush bag   Intravenous Sylvain Broderick MD        sodium chloride 0.9% 250 mL flush bag   Intravenous Sylvain Broderick MD        sodium chloride 0.9% 250 mL flush bag   Intravenous Sylvain Broderick MD        sodium chloride 0.9% 250 mL flush bag   Intravenous Sylvain Broderick MD        sodium chloride 0.9% 250 mL flush bag   Intravenous PRN Sylvain Ackerman MD        sodium chloride 0.9% flush 10 mL  10 mL Intravenous Sylvain Broderick MD        sodium chloride 0.9% flush 10 mL  10 mL Intravenous Sylvain Broderick MD   10 mL at 06/22/23 1028    sodium chloride 0.9% flush 10 mL  10 mL Intravenous Sylvain Broderick MD   10 mL at 06/29/23 0951    sodium chloride 0.9% flush 10 mL  10 mL Intravenous Sylvain Broderick MD   10 mL at 07/06/23 1007    sodium chloride 0.9% flush 10 mL  10 mL Intravenous PRN Sylvain Ackerman MD           Review of Systems:       Review of Systems   Constitutional:  Negative for chills and fever.   Gastrointestinal:  Negative for abdominal pain, constipation and diarrhea.   Genitourinary:  Positive for bladder incontinence and urgency. Negative for decreased libido, dysmenorrhea, dyspareunia, dysuria, flank pain, frequency, genital sores, hematuria, hot flashes, menorrhagia, menstrual problem, pelvic  "pain, vaginal bleeding, vaginal discharge, vaginal pain, urinary incontinence, postcoital bleeding, postmenopausal bleeding, vaginal dryness and vaginal odor.        Physical Exam:    /68 (BP Location: Right arm)   Ht 5' 5" (1.651 m)   Wt 79.3 kg (174 lb 12.8 oz)   BMI 29.09 kg/m²     Physical Exam     deferred  Assessment:   1. Urinary incontinence, unspecified type  -     solifenacin (VESICARE) 10 MG tablet; Take 1 tablet (10 mg total) by mouth once daily.  Dispense: 30 tablet; Refill: 11    2. Nocturia  -     solifenacin (VESICARE) 10 MG tablet; Take 1 tablet (10 mg total) by mouth once daily.  Dispense: 30 tablet; Refill: 11    Plan:       Will increase vesicare to 10 mg daily  Rx Vesicare 10mg   Educated on potential side effects  Will call in 1 month if no improvement or worsening and will RTC    RTC PRN  "

## 2023-08-17 ENCOUNTER — OFFICE VISIT (OUTPATIENT)
Dept: OBSTETRICS AND GYNECOLOGY | Facility: CLINIC | Age: 78
End: 2023-08-17
Payer: MEDICARE

## 2023-08-17 VITALS
WEIGHT: 174.81 LBS | HEIGHT: 65 IN | BODY MASS INDEX: 29.12 KG/M2 | DIASTOLIC BLOOD PRESSURE: 68 MMHG | SYSTOLIC BLOOD PRESSURE: 118 MMHG

## 2023-08-17 DIAGNOSIS — R35.1 NOCTURIA: ICD-10-CM

## 2023-08-17 DIAGNOSIS — R32 URINARY INCONTINENCE, UNSPECIFIED TYPE: Primary | ICD-10-CM

## 2023-08-17 PROCEDURE — 1159F PR MEDICATION LIST DOCUMENTED IN MEDICAL RECORD: ICD-10-PCS | Mod: ,,, | Performed by: OBSTETRICS & GYNECOLOGY

## 2023-08-17 PROCEDURE — 3078F PR MOST RECENT DIASTOLIC BLOOD PRESSURE < 80 MM HG: ICD-10-PCS | Mod: ,,, | Performed by: OBSTETRICS & GYNECOLOGY

## 2023-08-17 PROCEDURE — 3074F PR MOST RECENT SYSTOLIC BLOOD PRESSURE < 130 MM HG: ICD-10-PCS | Mod: ,,, | Performed by: OBSTETRICS & GYNECOLOGY

## 2023-08-17 PROCEDURE — 3078F DIAST BP <80 MM HG: CPT | Mod: ,,, | Performed by: OBSTETRICS & GYNECOLOGY

## 2023-08-17 PROCEDURE — 99213 PR OFFICE/OUTPT VISIT, EST, LEVL III, 20-29 MIN: ICD-10-PCS | Mod: ,,, | Performed by: OBSTETRICS & GYNECOLOGY

## 2023-08-17 PROCEDURE — 99213 OFFICE O/P EST LOW 20 MIN: CPT | Mod: ,,, | Performed by: OBSTETRICS & GYNECOLOGY

## 2023-08-17 PROCEDURE — 3074F SYST BP LT 130 MM HG: CPT | Mod: ,,, | Performed by: OBSTETRICS & GYNECOLOGY

## 2023-08-17 PROCEDURE — 1159F MED LIST DOCD IN RCRD: CPT | Mod: ,,, | Performed by: OBSTETRICS & GYNECOLOGY

## 2023-08-17 RX ORDER — SOLIFENACIN SUCCINATE 10 MG/1
10 TABLET, FILM COATED ORAL DAILY
Qty: 30 TABLET | Refills: 11 | Status: SHIPPED | OUTPATIENT
Start: 2023-08-17 | End: 2024-08-16

## 2023-08-22 ENCOUNTER — LAB VISIT (OUTPATIENT)
Dept: LAB | Facility: HOSPITAL | Age: 78
End: 2023-08-22
Attending: INTERNAL MEDICINE
Payer: MEDICAID

## 2023-08-22 DIAGNOSIS — E11.00 TYPE II DIABETES MELLITUS WITH HYPEROSMOLARITY, UNCONTROLLED: Primary | ICD-10-CM

## 2023-08-22 DIAGNOSIS — D50.9 IRON DEFICIENCY ANEMIA, UNSPECIFIED: ICD-10-CM

## 2023-08-22 LAB
ANION GAP SERPL CALC-SCNC: 9 MEQ/L (ref 2–13)
BUN SERPL-MCNC: 32 MG/DL (ref 7–20)
CALCIUM SERPL-MCNC: 9.2 MG/DL (ref 8.4–10.2)
CHLORIDE SERPL-SCNC: 103 MMOL/L (ref 98–110)
CO2 SERPL-SCNC: 28 MMOL/L (ref 21–32)
CREAT SERPL-MCNC: 1.46 MG/DL (ref 0.66–1.25)
CREAT/UREA NIT SERPL: 22 (ref 12–20)
EST. AVERAGE GLUCOSE BLD GHB EST-MCNC: 157.1 MG/DL (ref 70–115)
GFR SERPLBLD CREATININE-BSD FMLA CKD-EPI: 37 MLS/MIN/1.73/M2
GLUCOSE SERPL-MCNC: 146 MG/DL (ref 70–115)
HBA1C MFR BLD: 7.1 % (ref 4–6)
POTASSIUM SERPL-SCNC: 5.2 MMOL/L (ref 3.5–5.1)
SODIUM SERPL-SCNC: 140 MMOL/L (ref 135–145)

## 2023-08-22 PROCEDURE — 80048 BASIC METABOLIC PNL TOTAL CA: CPT

## 2023-08-22 PROCEDURE — 36415 COLL VENOUS BLD VENIPUNCTURE: CPT

## 2023-08-22 PROCEDURE — 83036 HEMOGLOBIN GLYCOSYLATED A1C: CPT

## 2023-08-30 ENCOUNTER — LAB VISIT (OUTPATIENT)
Dept: LAB | Facility: HOSPITAL | Age: 78
End: 2023-08-30
Attending: INTERNAL MEDICINE
Payer: MEDICARE

## 2023-08-30 DIAGNOSIS — R10.9 STOMACH ACHE: Primary | ICD-10-CM

## 2023-08-30 LAB
ALBUMIN SERPL-MCNC: 3.9 G/DL (ref 3.4–5)
ALBUMIN/GLOB SERPL: 1.3 RATIO
ALP SERPL-CCNC: 71 UNIT/L (ref 50–144)
ALT SERPL-CCNC: 77 UNIT/L (ref 1–45)
AMYLASE SERPL-CCNC: 54 UNIT/L (ref 30–100)
ANION GAP SERPL CALC-SCNC: 13 MEQ/L (ref 2–13)
APPEARANCE UR: CLEAR
AST SERPL-CCNC: 103 UNIT/L (ref 14–36)
BASOPHILS # BLD AUTO: 0.04 X10(3)/MCL (ref 0.01–0.08)
BASOPHILS NFR BLD AUTO: 0.9 % (ref 0.1–1.2)
BILIRUB SERPL-MCNC: 0.4 MG/DL (ref 0–1)
BILIRUB UR QL STRIP.AUTO: NEGATIVE
BUN SERPL-MCNC: 31 MG/DL (ref 7–20)
CALCIUM SERPL-MCNC: 8.6 MG/DL (ref 8.4–10.2)
CHLORIDE SERPL-SCNC: 100 MMOL/L (ref 98–110)
CO2 SERPL-SCNC: 24 MMOL/L (ref 21–32)
COLOR UR: YELLOW
CREAT SERPL-MCNC: 1.24 MG/DL (ref 0.66–1.25)
CREAT/UREA NIT SERPL: 25 (ref 12–20)
EOSINOPHIL # BLD AUTO: 0.07 X10(3)/MCL (ref 0.04–0.36)
EOSINOPHIL NFR BLD AUTO: 1.5 % (ref 0.7–7)
ERYTHROCYTE [DISTWIDTH] IN BLOOD BY AUTOMATED COUNT: 13.5 % (ref 11–14.5)
GFR SERPLBLD CREATININE-BSD FMLA CKD-EPI: 45 MLS/MIN/1.73/M2
GLOBULIN SER-MCNC: 2.9 GM/DL (ref 2–3.9)
GLUCOSE SERPL-MCNC: 266 MG/DL (ref 70–115)
GLUCOSE UR QL STRIP.AUTO: NEGATIVE
HCT VFR BLD AUTO: 29 % (ref 36–48)
HGB BLD-MCNC: 9.2 G/DL (ref 11.8–16)
IMM GRANULOCYTES # BLD AUTO: 0.01 X10(3)/MCL (ref 0–0.03)
IMM GRANULOCYTES NFR BLD AUTO: 0.2 % (ref 0–0.5)
KETONES UR QL STRIP.AUTO: NEGATIVE
LEUKOCYTE ESTERASE UR QL STRIP.AUTO: NEGATIVE
LIPASE SERPL-CCNC: 32 U/L (ref 23–300)
LYMPHOCYTES # BLD AUTO: 1.53 X10(3)/MCL (ref 1.16–3.74)
LYMPHOCYTES NFR BLD AUTO: 32.9 % (ref 20–55)
MCH RBC QN AUTO: 30.1 PG (ref 27–34)
MCHC RBC AUTO-ENTMCNC: 31.7 G/DL (ref 31–37)
MCV RBC AUTO: 94.8 FL (ref 79–99)
MONOCYTES # BLD AUTO: 0.35 X10(3)/MCL (ref 0.24–0.36)
MONOCYTES NFR BLD AUTO: 7.5 % (ref 4.7–12.5)
NEUTROPHILS # BLD AUTO: 2.65 X10(3)/MCL (ref 1.56–6.13)
NEUTROPHILS NFR BLD AUTO: 57 % (ref 37–73)
NITRITE UR QL STRIP.AUTO: NEGATIVE
NRBC BLD AUTO-RTO: 0 %
PH UR STRIP.AUTO: 6 [PH]
PLATELET # BLD AUTO: 183 X10(3)/MCL (ref 140–371)
PMV BLD AUTO: 11.2 FL (ref 9.4–12.4)
POTASSIUM SERPL-SCNC: 4.3 MMOL/L (ref 3.5–5.1)
PROT SERPL-MCNC: 6.8 GM/DL (ref 6.3–8.2)
PROT UR QL STRIP.AUTO: ABNORMAL
RBC # BLD AUTO: 3.06 X10(6)/MCL (ref 4–5.1)
RBC UR QL AUTO: NEGATIVE
SODIUM SERPL-SCNC: 137 MMOL/L (ref 135–145)
SP GR UR STRIP.AUTO: 1.01 (ref 1–1.03)
UROBILINOGEN UR STRIP-ACNC: 0.2
WBC # SPEC AUTO: 4.65 X10(3)/MCL (ref 4–11.5)

## 2023-08-30 PROCEDURE — 85025 COMPLETE CBC W/AUTO DIFF WBC: CPT

## 2023-08-30 PROCEDURE — 36415 COLL VENOUS BLD VENIPUNCTURE: CPT

## 2023-08-30 PROCEDURE — 81003 URINALYSIS AUTO W/O SCOPE: CPT

## 2023-08-30 PROCEDURE — 82150 ASSAY OF AMYLASE: CPT

## 2023-08-30 PROCEDURE — 83690 ASSAY OF LIPASE: CPT

## 2023-08-30 PROCEDURE — 80053 COMPREHEN METABOLIC PANEL: CPT

## 2023-09-06 ENCOUNTER — HOSPITAL ENCOUNTER (OUTPATIENT)
Dept: RADIOLOGY | Facility: HOSPITAL | Age: 78
Discharge: HOME | End: 2023-09-06
Attending: INTERNAL MEDICINE
Payer: MEDICARE

## 2023-09-06 DIAGNOSIS — R10.9 AP (ABDOMINAL PAIN): ICD-10-CM

## 2023-09-06 PROCEDURE — 25500020 PHARM REV CODE 255: Performed by: INTERNAL MEDICINE

## 2023-09-06 PROCEDURE — 74178 CT ABD&PLV WO CNTR FLWD CNTR: CPT | Mod: TC

## 2023-09-06 PROCEDURE — A9698 NON-RAD CONTRAST MATERIALNOC: HCPCS | Performed by: INTERNAL MEDICINE

## 2023-09-06 RX ADMIN — IOHEXOL 1000 ML: 9 SOLUTION ORAL at 09:09

## 2023-09-06 RX ADMIN — IOPAMIDOL 100 ML: 612 INJECTION, SOLUTION INTRAVENOUS at 11:09

## 2023-10-12 ENCOUNTER — HOSPITAL ENCOUNTER (OUTPATIENT)
Dept: RADIOLOGY | Facility: HOSPITAL | Age: 78
Discharge: HOME | End: 2023-10-12
Attending: INTERNAL MEDICINE
Payer: MEDICARE

## 2023-10-12 DIAGNOSIS — R51.9 FACIAL PAIN: ICD-10-CM

## 2023-10-12 PROCEDURE — 70450 CT HEAD/BRAIN W/O DYE: CPT | Mod: TC

## 2023-11-30 NOTE — PROGRESS NOTES
Chief Complaint     No chief complaint on file.    HPI:     Patient is a 78 y.o.  presents today    MENOPAUSAL:  Age at menarche: 14  Age at menopause: MARY  Hysterectomy:  Yes  Last pap: MARY  H/o Abnormal Pap: No   Colposcopy: none  Postcoital Bleeding: No  Sexually Active: not currently   Dyspareunia: No  H/o STI: No   HRT: none  MM2022 L breast  H/o abnl MMG: hx of breast cancer. R mast    Past Medical History:   Diagnosis Date    Cancer     Diabetes mellitus     High cholesterol     Hypertension     Stroke        Past Surgical History:   Procedure Laterality Date    BREAST SURGERY      COLON SURGERY      COLONOSCOPY  10/05/2016    HYSTERECTOMY         No family history on file.    OB History          3    Para   3    Term   3            AB        Living   3         SAB        IAB        Ectopic        Multiple        Live Births   3                 Current Outpatient Medications on File Prior to Visit   Medication Sig Dispense Refill    amLODIPine (NORVASC) 10 MG tablet Take 10 mg by mouth.      esomeprazole (NEXIUM) 40 MG capsule Take 40 mg by mouth 2 (two) times daily.      glipiZIDE (GLUCOTROL) 10 MG TR24 Take 10 mg by mouth 2 (two) times daily.      hydroCHLOROthiazide (HYDRODIURIL) 25 MG tablet Take 25 mg by mouth.      HYDROcodone-acetaminophen (NORCO)  mg per tablet TAKE 1 TABLET 3 TIMES DAILY AS NEEDED FOR PAIN --MAY CAUSE DROWSINESS--      metFORMIN (GLUCOPHAGE) 1000 MG tablet Take 1,000 mg by mouth 2 (two) times daily.      pantoprazole (PROTONIX) 40 MG tablet Take 40 mg by mouth 2 (two) times daily.      pioglitazone (ACTOS) 45 MG tablet Take 45 mg by mouth.      rosuvastatin (CRESTOR) 40 MG Tab Take 40 mg by mouth.      solifenacin (VESICARE) 10 MG tablet Take 1 tablet (10 mg total) by mouth once daily. 30 tablet 11    solifenacin (VESICARE) 5 MG tablet Take 1 tablet (5 mg total) by mouth once daily. 30 tablet 11    sucralfate (CARAFATE) 1 gram tablet Take 1 g by  mouth.      triamcinolone acetonide 0.1% (KENALOG) 0.1 % cream APPLY TO AFFECTED AREA TWO TIMES A DAY      TRUE METRIX GLUCOSE TEST STRIP Strp USE 1 STRIP TO TEST 3 TIMES A DAY AS DIRECTED       Current Facility-Administered Medications on File Prior to Visit   Medication Dose Route Frequency Provider Last Rate Last Admin    heparin, porcine (PF) 100 unit/mL injection flush 500 Units  500 Units Intravenous Sylvain Broderick MD        heparin, porcine (PF) 100 unit/mL injection flush 500 Units  500 Units Intravenous PRN Sylvain Ackerman MD        sodium chloride 0.9% 250 mL flush bag   Intravenous PRN Syvlain Ackerman MD        sodium chloride 0.9% 250 mL flush bag   Intravenous PRN Sylvain Ackerman MD        sodium chloride 0.9% 250 mL flush bag   Intravenous PRN Sylvain Ackerman MD        sodium chloride 0.9% 250 mL flush bag   Intravenous PRN Sylvain Ackerman MD        sodium chloride 0.9% 250 mL flush bag   Intravenous PRN Sylvain Ackerman MD        sodium chloride 0.9% flush 10 mL  10 mL Intravenous PRN Sylvain Ackerman MD        sodium chloride 0.9% flush 10 mL  10 mL Intravenous Sylvain Broderick MD   10 mL at 06/22/23 1028    sodium chloride 0.9% flush 10 mL  10 mL Intravenous Sylvain Broderick MD   10 mL at 06/29/23 0951    sodium chloride 0.9% flush 10 mL  10 mL Intravenous Sylvain Broderick MD   10 mL at 07/06/23 1007    sodium chloride 0.9% flush 10 mL  10 mL Intravenous PRN Sylvain Ackerman MD           Review of Systems:       Review of Systems     Physical Exam:    There were no vitals taken for this visit.    Physical Exam       Assessment:   {There are no diagnoses linked to this encounter. (Refresh or delete this SmartLink)}         Plan:   There are no diagnoses linked to this encounter.

## 2023-12-01 ENCOUNTER — OFFICE VISIT (OUTPATIENT)
Dept: OBSTETRICS AND GYNECOLOGY | Facility: CLINIC | Age: 78
End: 2023-12-01
Payer: MEDICARE

## 2023-12-01 VITALS
HEIGHT: 65 IN | WEIGHT: 176 LBS | DIASTOLIC BLOOD PRESSURE: 82 MMHG | SYSTOLIC BLOOD PRESSURE: 136 MMHG | BODY MASS INDEX: 29.32 KG/M2

## 2023-12-01 DIAGNOSIS — N81.11 CYSTOCELE, MIDLINE: ICD-10-CM

## 2023-12-01 DIAGNOSIS — N95.2 VAGINAL ATROPHY: ICD-10-CM

## 2023-12-01 DIAGNOSIS — N94.9 VAGINAL DISCOMFORT: ICD-10-CM

## 2023-12-01 DIAGNOSIS — N39.44 NOCTURNAL ENURESIS: Primary | ICD-10-CM

## 2023-12-01 PROCEDURE — 3079F PR MOST RECENT DIASTOLIC BLOOD PRESSURE 80-89 MM HG: ICD-10-PCS | Mod: ,,, | Performed by: OBSTETRICS & GYNECOLOGY

## 2023-12-01 PROCEDURE — 1159F MED LIST DOCD IN RCRD: CPT | Mod: ,,, | Performed by: OBSTETRICS & GYNECOLOGY

## 2023-12-01 PROCEDURE — 3075F PR MOST RECENT SYSTOLIC BLOOD PRESS GE 130-139MM HG: ICD-10-PCS | Mod: ,,, | Performed by: OBSTETRICS & GYNECOLOGY

## 2023-12-01 PROCEDURE — 99213 PR OFFICE/OUTPT VISIT, EST, LEVL III, 20-29 MIN: ICD-10-PCS | Mod: ,,, | Performed by: OBSTETRICS & GYNECOLOGY

## 2023-12-01 PROCEDURE — 1126F PR PAIN SEVERITY QUANTIFIED, NO PAIN PRESENT: ICD-10-PCS | Mod: ,,, | Performed by: OBSTETRICS & GYNECOLOGY

## 2023-12-01 PROCEDURE — 1159F PR MEDICATION LIST DOCUMENTED IN MEDICAL RECORD: ICD-10-PCS | Mod: ,,, | Performed by: OBSTETRICS & GYNECOLOGY

## 2023-12-01 PROCEDURE — 1126F AMNT PAIN NOTED NONE PRSNT: CPT | Mod: ,,, | Performed by: OBSTETRICS & GYNECOLOGY

## 2023-12-01 PROCEDURE — 99213 OFFICE O/P EST LOW 20 MIN: CPT | Mod: ,,, | Performed by: OBSTETRICS & GYNECOLOGY

## 2023-12-01 PROCEDURE — 3079F DIAST BP 80-89 MM HG: CPT | Mod: ,,, | Performed by: OBSTETRICS & GYNECOLOGY

## 2023-12-01 PROCEDURE — 3075F SYST BP GE 130 - 139MM HG: CPT | Mod: ,,, | Performed by: OBSTETRICS & GYNECOLOGY

## 2023-12-01 NOTE — PROGRESS NOTES
"  Chief Complaint     Follow-up    HPI:     Patient is a 78 y.o.  with h/o urinary incontinence and cystocele s/p hyst presents today following initiation of Vesicare 10 mg.   She c/o "Feeling like my panties are wet, but when I check they are not damp." Nocturia and urgency are better with vesicare.         MENOPAUSAL:  Age at menarche: 14  Age at menopause: MARY  Hysterectomy:  Yes  Last pap: MARY  H/o Abnormal Pap: No   Colposcopy: none  Postcoital Bleeding: No  Sexually Active: not currently   Dyspareunia: No  H/o STI: No   HRT: none  MM2022 L breast  H/o abnl MMG: hx of breast cancer. R mast    Past Medical History:   Diagnosis Date    Cancer     Diabetes mellitus     High cholesterol     Hypertension     Stroke        Past Surgical History:   Procedure Laterality Date    BREAST SURGERY      COLON SURGERY      COLONOSCOPY  10/05/2016    HYSTERECTOMY         History reviewed. No pertinent family history.    OB History          3    Para   3    Term   3            AB        Living   3         SAB        IAB        Ectopic        Multiple        Live Births   3                 Current Outpatient Medications on File Prior to Visit   Medication Sig Dispense Refill    amLODIPine (NORVASC) 10 MG tablet Take 10 mg by mouth.      esomeprazole (NEXIUM) 40 MG capsule Take 40 mg by mouth 2 (two) times daily.      glipiZIDE (GLUCOTROL) 10 MG TR24 Take 10 mg by mouth 2 (two) times daily.      hydroCHLOROthiazide (HYDRODIURIL) 25 MG tablet Take 25 mg by mouth.      metFORMIN (GLUCOPHAGE) 1000 MG tablet Take 1,000 mg by mouth 2 (two) times daily.      pantoprazole (PROTONIX) 40 MG tablet Take 40 mg by mouth 2 (two) times daily.      pioglitazone (ACTOS) 45 MG tablet Take 45 mg by mouth.      rosuvastatin (CRESTOR) 40 MG Tab Take 40 mg by mouth.      solifenacin (VESICARE) 10 MG tablet Take 1 tablet (10 mg total) by mouth once daily. 30 tablet 11    sucralfate (CARAFATE) 1 gram tablet Take 1 g by " "mouth.      triamcinolone acetonide 0.1% (KENALOG) 0.1 % cream APPLY TO AFFECTED AREA TWO TIMES A DAY      TRUE METRIX GLUCOSE TEST STRIP Strp USE 1 STRIP TO TEST 3 TIMES A DAY AS DIRECTED      HYDROcodone-acetaminophen (NORCO)  mg per tablet TAKE 1 TABLET 3 TIMES DAILY AS NEEDED FOR PAIN --MAY CAUSE DROWSINESS--      solifenacin (VESICARE) 5 MG tablet Take 1 tablet (5 mg total) by mouth once daily. 30 tablet 11     Current Facility-Administered Medications on File Prior to Visit   Medication Dose Route Frequency Provider Last Rate Last Admin    heparin, porcine (PF) 100 unit/mL injection flush 500 Units  500 Units Intravenous PRN Sylvain Ackerman MD        heparin, porcine (PF) 100 unit/mL injection flush 500 Units  500 Units Intravenous PRN Sylvain Ackerman MD        sodium chloride 0.9% 250 mL flush bag   Intravenous PRN Sylvain Ackerman MD        sodium chloride 0.9% 250 mL flush bag   Intravenous PRN Sylvain Ackerman MD        sodium chloride 0.9% 250 mL flush bag   Intravenous PRN Sylvain Ackerman MD        sodium chloride 0.9% 250 mL flush bag   Intravenous PRN Sylvain Ackerman MD        sodium chloride 0.9% 250 mL flush bag   Intravenous PRN Sylvain Ackerman MD        sodium chloride 0.9% flush 10 mL  10 mL Intravenous PRN Sylvain Ackerman MD        sodium chloride 0.9% flush 10 mL  10 mL Intravenous Sylvain Broderick MD   10 mL at 06/22/23 1028    sodium chloride 0.9% flush 10 mL  10 mL Intravenous PRN Sylvain Ackerman MD   10 mL at 06/29/23 0951    sodium chloride 0.9% flush 10 mL  10 mL Intravenous Sylvain Broderick MD   10 mL at 07/06/23 1007    sodium chloride 0.9% flush 10 mL  10 mL Intravenous PRN Sylvain Ackerman MD           Review of Systems:       Review of Systems     Physical Exam:    /82 (BP Location: Left arm, Patient Position: Sitting)   Ht 5' 5" (1.651 m)   Wt 79.8 kg (176 lb)   BMI 29.29 kg/m²     Chaperone present.       Constitutional: General appearance: " healthy, well-nourished and well-developed  Psychiatric:  Orientation to time, place and person. Normal mood and affect and active, alert   Abdomen: Auscultation/Inspection/Palpation: No tenderness or masses. Soft, nondistended      Female Genitalia:      Vulva: no masses, atrophy or lesions      Bladder/Urethra: no urethral discharge or mass, normal meatus, bladder non-distended.      Vagina: no tenderness, erythema, cystocele, rectocele, abnormal vaginal discharge, or vesicle(s) or ulcers                   Cervix: no discharge or cervical motion tenderness and grossly normal      Uterus: normal size and shape and midline, non-tender, and no uterine prolapse.      Adnexa/Parametria: no parametrial tenderness or mass, no adnexal tenderness or ovarian mass.         Assessment:   1. Urinary incontinence, unspecified type  Educated  Urge improved  Recommended to continue Vesicare    2. Cystocele, midline  Educated   Discussed treatment options including conservative measures, pessary insertion, surgical management. Declines management at this time.    Strongly recommend use of vaginal premarin cream to help with atrophy incase she wants to pursue surgery or pessary in the future.     3. Vaginal atrophy  Will refill premarin vaginal cream    4. Vaginal discomfort  Stable exam today    RTC prn

## 2024-01-27 ENCOUNTER — HOSPITAL ENCOUNTER (EMERGENCY)
Facility: HOSPITAL | Age: 79
Discharge: HOME | End: 2024-01-27
Attending: FAMILY MEDICINE
Payer: MEDICARE

## 2024-01-27 VITALS
TEMPERATURE: 98 F | OXYGEN SATURATION: 100 % | HEIGHT: 65 IN | BODY MASS INDEX: 28.32 KG/M2 | RESPIRATION RATE: 20 BRPM | DIASTOLIC BLOOD PRESSURE: 75 MMHG | HEART RATE: 90 BPM | SYSTOLIC BLOOD PRESSURE: 159 MMHG | WEIGHT: 170 LBS

## 2024-01-27 DIAGNOSIS — T14.8XXA SPLINTER IN SKIN: Primary | ICD-10-CM

## 2024-01-27 PROCEDURE — 99282 EMERGENCY DEPT VISIT SF MDM: CPT

## 2024-01-27 PROCEDURE — 10120 INC&RMVL FB SUBQ TISS SMPL: CPT

## 2024-01-27 NOTE — ED PROVIDER NOTES
Encounter Date: 1/27/2024       History     Chief Complaint   Patient presents with    Foreign Body in Skin     C/o splinter in rt pointer finger x 1 week     Britta presents with a splinter to her right index finger for about a week.      The history is provided by the patient.     Review of patient's allergies indicates:   Allergen Reactions    Latex      Past Medical History:   Diagnosis Date    Cancer     Diabetes mellitus     High cholesterol     Hypertension     Stroke      Past Surgical History:   Procedure Laterality Date    BREAST SURGERY      COLON SURGERY      COLONOSCOPY  10/05/2016    HYSTERECTOMY       History reviewed. No pertinent family history.  Social History     Tobacco Use    Smoking status: Former     Types: Cigarettes    Smokeless tobacco: Never   Substance Use Topics    Alcohol use: Never    Drug use: Never     Review of Systems   Constitutional:  Negative for fatigue.   Cardiovascular:  Negative for chest pain and palpitations.   Skin:  Positive for wound (splinter to right index finger x 1 week).       Physical Exam     Initial Vitals [01/27/24 1641]   BP Pulse Resp Temp SpO2   (!) 159/75 90 20 97.9 °F (36.6 °C) 100 %      MAP       --         Physical Exam    Nursing note and vitals reviewed.  Constitutional: She appears well-developed and well-nourished. No distress.   Cardiovascular:  Normal rate, regular rhythm and normal heart sounds.           Pulmonary/Chest: Breath sounds normal.   Abdominal: Abdomen is soft. There is no abdominal tenderness. There is no rebound and no guarding.     Neurological: She is alert and oriented to person, place, and time.   Skin: Skin is warm and dry.   Very small splinter right index finger     Psychiatric: She has a normal mood and affect. Her behavior is normal. Thought content normal.         ED Course   Foreign Body    Date/Time: 1/27/2024 4:31 PM    Performed by: JIMBO Alvarado FNP-C  Authorized by: Ye Lobo MD  Consent Done:  Yes  Consent: Verbal consent obtained.  Consent given by: patient  Body area: skin  General location: upper extremity  Location details: right index finger  Localization method: magnification  Removal mechanism: 22 guage needle.  Complexity: simple  1 objects recovered.  Objects recovered: splinter  Post-procedure assessment: foreign body removed  Patient tolerance: Patient tolerated the procedure well with no immediate complications      Labs Reviewed - No data to display       Imaging Results    None          Medications - No data to display  Medical Decision Making                                    Clinical Impression:  Final diagnoses:  [T14.8XXA] Splinter in skin (Primary)          ED Disposition Condition    Discharge Stable          ED Prescriptions    None       Follow-up Information       Follow up With Specialties Details Why Contact Info    Sylvain Ackerman MD Internal Medicine, General Practice  As needed [address]  [facility] [address]  [phone]      [facility] Emergency Medicine  If symptoms worsen [address]  [facility] [id number] [phone]             JIMBO Alvarado, FNP-C  01/27/24 4493

## 2024-02-21 ENCOUNTER — HOSPITAL ENCOUNTER (OUTPATIENT)
Dept: RADIOLOGY | Facility: HOSPITAL | Age: 79
Discharge: HOME | End: 2024-02-21
Attending: INTERNAL MEDICINE
Payer: MEDICARE

## 2024-02-21 DIAGNOSIS — M54.2 CERVICALGIA: ICD-10-CM

## 2024-02-21 PROCEDURE — 72040 X-RAY EXAM NECK SPINE 2-3 VW: CPT | Mod: TC

## 2024-03-15 ENCOUNTER — HOSPITAL ENCOUNTER (EMERGENCY)
Facility: HOSPITAL | Age: 79
Discharge: HOME | End: 2024-03-15
Attending: STUDENT IN AN ORGANIZED HEALTH CARE EDUCATION/TRAINING PROGRAM
Payer: COMMERCIAL

## 2024-03-15 VITALS
RESPIRATION RATE: 16 BRPM | DIASTOLIC BLOOD PRESSURE: 76 MMHG | TEMPERATURE: 98 F | BODY MASS INDEX: 32.14 KG/M2 | HEART RATE: 90 BPM | WEIGHT: 200 LBS | OXYGEN SATURATION: 97 % | SYSTOLIC BLOOD PRESSURE: 169 MMHG | HEIGHT: 66 IN
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
## 2024-03-15 PROCEDURE — 99283 EMERGENCY DEPT VISIT LOW MDM: CPT
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
## 2024-03-15 NOTE — DISCHARGE INSTRUCTIONS
If develop any pains, take tylenol and motrin as needed.   PRECAUTIONS: If develop any N/V, blurred vision, head pain return to ED for evaluation.

## 2024-04-10 NOTE — PROGRESS NOTES
Chief Complaint: Annual exam    Chief Complaint   Patient presents with    Urinary Incontinence       HPI:   Britta Rivas is a 78 y.o. year old  S/P MARY here for her Annual Exam.  Hx of breast cancer and is s/p right mastectomy.  Currently on Vesicare 10mg for urinary urgency and nocturia.  Med is working well..  Denies leakage of urine. Reports has not been using Premarin cream.    Gyn hx:  MENOPAUSAL:  Age at menarche: 14  Age at menopause: MARY  Hysterectomy: Yes  Last pap: unsure  H/o abnl pap: No   Colposcopy: none  Postcoital bleeding: No  Sexually active: not currently   Dyspareunia: No  H/o STI: No   HRT: none  MM2022 L breast  H/o abnl MMG: hx of breast cancer. R mastectomy   Colonoscopy: yes       Past Medical History:   Diagnosis Date    Diabetes mellitus     High cholesterol     History of right breast cancer     History of stomach cancer     Hypertension     Stroke      Past Surgical History:   Procedure Laterality Date    COLON SURGERY      COLONOSCOPY  10/05/2016    MASTECTOMY Right     TOTAL ABDOMINAL HYSTERECTOMY         Current Outpatient Medications:     ALPRAZolam (XANAX) 0.5 MG tablet, Take 0.5 mg by mouth every 8 (eight) hours as needed., Disp: , Rfl:     amLODIPine (NORVASC) 10 MG tablet, Take 10 mg by mouth., Disp: , Rfl:     [START ON 2024] conjugated estrogens (PREMARIN) vaginal cream, Place 0.5 g vaginally twice a week. Insert 0.5 g vaginally at night for two weeks then 2-3 times a week thereafter, Disp: 30 g, Rfl: 1    esomeprazole (NEXIUM) 40 MG capsule, Take 40 mg by mouth 2 (two) times daily., Disp: , Rfl:     glipiZIDE (GLUCOTROL) 10 MG TR24, Take 10 mg by mouth 2 (two) times daily., Disp: , Rfl:     hydroCHLOROthiazide (HYDRODIURIL) 25 MG tablet, Take 25 mg by mouth., Disp: , Rfl:     HYDROcodone-acetaminophen (NORCO)  mg per tablet, TAKE 1 TABLET 3 TIMES DAILY AS NEEDED FOR PAIN --MAY CAUSE DROWSINESS--, Disp: , Rfl:     metFORMIN (GLUCOPHAGE) 1000 MG  tablet, Take 1,000 mg by mouth 2 (two) times daily., Disp: , Rfl:     pantoprazole (PROTONIX) 40 MG tablet, Take 40 mg by mouth 2 (two) times daily., Disp: , Rfl:     pioglitazone (ACTOS) 45 MG tablet, Take 45 mg by mouth., Disp: , Rfl:     rosuvastatin (CRESTOR) 40 MG Tab, Take 40 mg by mouth., Disp: , Rfl:     solifenacin (VESICARE) 10 MG tablet, Take 1 tablet (10 mg total) by mouth once daily., Disp: 30 tablet, Rfl: 11    sucralfate (CARAFATE) 1 gram tablet, Take 1 g by mouth., Disp: , Rfl:     triamcinolone acetonide 0.1% (KENALOG) 0.1 % cream, APPLY TO AFFECTED AREA TWO TIMES A DAY, Disp: , Rfl:     TRUE METRIX GLUCOSE TEST STRIP Strp, USE 1 STRIP TO TEST 3 TIMES A DAY AS DIRECTED, Disp: , Rfl:     Current Facility-Administered Medications:     heparin, porcine (PF) 100 unit/mL injection flush 500 Units, 500 Units, Intravenous, PRTyron JUNG Brian C., MD    heparin, porcine (PF) 100 unit/mL injection flush 500 Units, 500 Units, Intravenous, PRTyron JUNG Brian C., MD    sodium chloride 0.9% 250 mL flush bag, , Intravenous, PRNTyron Brian C., MD    sodium chloride 0.9% 250 mL flush bag, , Intravenous, PRTyron JUNG Brian C., MD    sodium chloride 0.9% 250 mL flush bag, , Intravenous, PRNTyron Brian C., MD    sodium chloride 0.9% 250 mL flush bag, , Intravenous, PRNTyron Brian C., MD    sodium chloride 0.9% 250 mL flush bag, , Intravenous, Tyron YADAV Brian C., MD    sodium chloride 0.9% flush 10 mL, 10 mL, Intravenous, Tyron YADAV Brian C., MD    sodium chloride 0.9% flush 10 mL, 10 mL, Intravenous, Tyron YADAV Brian C., MD, 10 mL at 06/22/23 1028    sodium chloride 0.9% flush 10 mL, 10 mL, Intravenous, PRN, Sylvain Ackerman MD, 10 mL at 06/29/23 0951    sodium chloride 0.9% flush 10 mL, 10 mL, Intravenous, PRN, Sylvain Ackerman MD, 10 mL at 07/06/23 1007    sodium chloride 0.9% flush 10 mL, 10 mL, Intravenous, PRN, Sylvain Ackerman MD  Review of patient's allergies indicates:   Allergen Reactions     Latex      OB History    Para Term  AB Living   3 3 3     3   SAB IAB Ectopic Multiple Live Births           3      # Outcome Date GA Lbr Ja/2nd Weight Sex Type Anes PTL Lv   3 Term 77    M Vag-Spont   YULIANA   2 Term 69     Vag-Spont   YULIANA   1 Term 68    F Vag-Spont   YULIANA     Social History     Tobacco Use    Smoking status: Former     Types: Cigarettes    Smokeless tobacco: Never   Substance Use Topics    Alcohol use: Never    Drug use: Never     Family History   Problem Relation Name Age of Onset    Breast cancer Sister      Uterine cancer Neg Hx      Ovarian cancer Neg Hx      Colon cancer Neg Hx         Review of Systems:  Review of Systems   Constitutional:  Negative for appetite change, chills, fatigue, fever and unexpected weight change.   Eyes:  Negative for visual disturbance.   Respiratory:  Negative for cough, shortness of breath and wheezing.    Cardiovascular:  Negative for chest pain, palpitations and leg swelling.   Gastrointestinal:  Negative for abdominal pain, bloating, blood in stool, constipation, diarrhea, nausea, vomiting, reflux and fecal incontinence.   Endocrine: Negative for hair loss and hot flashes.   Genitourinary:  Negative for bladder incontinence, decreased libido, dysmenorrhea, dyspareunia, dysuria, flank pain, frequency, genital sores, hematuria, hot flashes, menorrhagia, menstrual problem, pelvic pain, urgency, vaginal bleeding, vaginal discharge, vaginal pain, urinary incontinence, postcoital bleeding, postmenopausal bleeding, vaginal dryness and vaginal odor.   Integumentary:  Negative for rash, acne, hair changes, breast mass, nipple discharge, breast skin changes and breast tenderness.   Neurological:  Negative for headaches.   Psychiatric/Behavioral:  Negative for depression.    Breast: Negative for asymmetry, breast self exam, lump, mass, mastodynia, nipple discharge, skin changes and tenderness       Physical Exam:   Vitals:    24 0848    BP: 138/70   Temp: 98.2 °F (36.8 °C)     Body mass index is 29.29 kg/m².    Physical Exam:   Constitutional: She is oriented to person, place, and time. She appears well-developed and well-nourished.    HENT:   Head: Normocephalic.      Cardiovascular:       Exam reveals no edema.        Pulmonary/Chest: Effort normal. She exhibits no mass, no tenderness, no bony tenderness, no deformity and no retraction. Right breast exhibits no inverted nipple, no mass, no nipple discharge, no skin change, no tenderness, no bleeding, no swelling, no mastectomy, no augmentation and no lumpectomy. Left breast exhibits no inverted nipple, no mass, no nipple discharge, no skin change, no tenderness, no bleeding, no swelling, no mastectomy, no augmentation and no lumpectomy. Breasts are symmetrical.        Abdominal: Soft. She exhibits no distension and no mass. There is no abdominal tenderness. There is no rebound and no guarding. No hernia. Hernia confirmed negative in the right inguinal area.   Anterior ventricle wall hernia   Non tender, non bothersome       Genitourinary:    Inguinal canal, right adnexa, left adnexa and rectum normal.   Rectum:      No anal fissure or external hemorrhoid.   The external female genitalia was normal.   No external genitalia lesions identified,Genitalia hair distrobution normal .     Labial bartholins normal.There is no rash, tenderness, lesion or injury on the right labia. There is no rash, tenderness, lesion or injury on the left labia. No no masses or organomegaly. Right adnexum displays no mass, no tenderness and no fullness. Left adnexum displays no mass, no tenderness and no fullness. Vagina exhibits no lesion. No erythema, vaginal discharge, tenderness, bleeding, rectocele, cystocele or prolapse of vaginal walls in the vagina.    No foreign body in the vagina.      No signs of injury in the vagina.   Vagina was moist.Cervix is absent.Uterus is absent. Normal urethral meatus.Urethral Meatus  exhibits: urethral lesionUrethra findings: no urethral mass, no tenderness and prolapsedBladder findings: no bladder tenderness   Genitourinary Comments: Cystocele grade 3             Musculoskeletal: Normal range of motion.      Lymphadenopathy: No inguinal adenopathy noted on the right or left side.    Neurological: She is alert and oriented to person, place, and time.    Skin: Skin is warm and dry.    Psychiatric: She has a normal mood and affect. Her behavior is normal. Judgment and thought content normal.        Assessment:   Annual Well Women Exam  1. Encounter for well woman exam with abnormal findings    2. Nocturia  - solifenacin (VESICARE) 10 MG tablet; Take 1 tablet (10 mg total) by mouth once daily.  Dispense: 30 tablet; Refill: 11    3. Urge incontinence of urine    4. Cystocele, midline  - conjugated estrogens (PREMARIN) vaginal cream; Place 0.5 g vaginally twice a week. Insert 0.5 g vaginally at night for two weeks then 2-3 times a week thereafter  Dispense: 30 g; Refill: 1    5. Hernia of abdominal wall    6. Vaginal atrophy  - conjugated estrogens (PREMARIN) vaginal cream; Place 0.5 g vaginally twice a week. Insert 0.5 g vaginally at night for two weeks then 2-3 times a week thereafter  Dispense: 30 g; Refill: 1    7. Raccoon-Walker grade 3 cystocele      Health Maintenance Topics with due status: Not Due       Topic Last Completion Date    TETANUS VACCINE 05/07/2018         Plan:  Breast self-awareness  Annual mammograms  Rec colonoscopy per guidelines  Rec BMD per guidelines  Rec exercise 3 times weekly  Keep yearly FU with PCP  Follow up for PRN/ANNUAL .     Discussed cystocele.  She declines treatment.    Recommend restart Premarin cream to prevent vaginal dryness and bleeding from prolapse. She agreees.    Cont Vesicare     Hernia precautions     RTC prn/annual     This note was transcribed by Natalia Danielle. There may be transcription errors as a result, however minimal. Effort has been made to  ensure accuracy of transcription, but any obvious errors or omissions should be clarified with the author of the document.

## 2024-04-19 ENCOUNTER — OFFICE VISIT (OUTPATIENT)
Dept: OBSTETRICS AND GYNECOLOGY | Facility: CLINIC | Age: 79
End: 2024-04-19
Payer: COMMERCIAL

## 2024-04-19 VITALS
HEIGHT: 65 IN | WEIGHT: 176 LBS | TEMPERATURE: 98 F | BODY MASS INDEX: 29.32 KG/M2 | DIASTOLIC BLOOD PRESSURE: 70 MMHG | SYSTOLIC BLOOD PRESSURE: 138 MMHG

## 2024-04-19 DIAGNOSIS — K43.9 HERNIA OF ABDOMINAL WALL: ICD-10-CM

## 2024-04-19 DIAGNOSIS — N81.11 CYSTOCELE, MIDLINE: ICD-10-CM

## 2024-04-19 DIAGNOSIS — N39.41 URGE INCONTINENCE OF URINE: ICD-10-CM

## 2024-04-19 DIAGNOSIS — Z01.411 ENCOUNTER FOR WELL WOMAN EXAM WITH ABNORMAL FINDINGS: Primary | ICD-10-CM

## 2024-04-19 DIAGNOSIS — N95.2 VAGINAL ATROPHY: ICD-10-CM

## 2024-04-19 DIAGNOSIS — N81.10 BADEN-WALKER GRADE 3 CYSTOCELE: ICD-10-CM

## 2024-04-19 DIAGNOSIS — R35.1 NOCTURIA: ICD-10-CM

## 2024-04-19 PROCEDURE — 99397 PER PM REEVAL EST PAT 65+ YR: CPT | Mod: ,,, | Performed by: OBSTETRICS & GYNECOLOGY

## 2024-04-19 PROCEDURE — 1159F MED LIST DOCD IN RCRD: CPT | Mod: CPTII,,, | Performed by: OBSTETRICS & GYNECOLOGY

## 2024-04-19 PROCEDURE — 3075F SYST BP GE 130 - 139MM HG: CPT | Mod: CPTII,,, | Performed by: OBSTETRICS & GYNECOLOGY

## 2024-04-19 PROCEDURE — 3078F DIAST BP <80 MM HG: CPT | Mod: CPTII,,, | Performed by: OBSTETRICS & GYNECOLOGY

## 2024-04-19 RX ORDER — ALPRAZOLAM 0.5 MG/1
0.5 TABLET ORAL EVERY 8 HOURS PRN
COMMUNITY
Start: 2024-01-22

## 2024-04-19 RX ORDER — SOLIFENACIN SUCCINATE 10 MG/1
10 TABLET, FILM COATED ORAL DAILY
Qty: 30 TABLET | Refills: 11 | Status: SHIPPED | OUTPATIENT
Start: 2024-04-19 | End: 2025-04-19

## 2024-05-22 ENCOUNTER — HOSPITAL ENCOUNTER (EMERGENCY)
Facility: HOSPITAL | Age: 79
Discharge: HOME | End: 2024-05-22
Payer: MEDICARE

## 2024-05-22 VITALS
HEIGHT: 65 IN | HEART RATE: 100 BPM | OXYGEN SATURATION: 99 % | BODY MASS INDEX: 29.24 KG/M2 | TEMPERATURE: 98 F | RESPIRATION RATE: 20 BRPM | SYSTOLIC BLOOD PRESSURE: 164 MMHG | WEIGHT: 175.5 LBS | DIASTOLIC BLOOD PRESSURE: 82 MMHG

## 2024-05-22 DIAGNOSIS — E86.0 DEHYDRATION, MILD: Primary | ICD-10-CM

## 2024-05-22 DIAGNOSIS — R53.1 WEAKNESS: ICD-10-CM

## 2024-05-22 LAB
ALBUMIN SERPL-MCNC: 3.8 G/DL (ref 3.4–5)
ALBUMIN/GLOB SERPL: 1.2 RATIO
ALP SERPL-CCNC: 77 UNIT/L (ref 50–144)
ALT SERPL-CCNC: 96 UNIT/L (ref 1–45)
AMMONIA PLAS-MSCNC: <9 UMOL/L (ref 11–32)
AMPHET UR QL SCN: NEGATIVE
ANION GAP SERPL CALC-SCNC: 9 MEQ/L (ref 2–13)
AST SERPL-CCNC: 132 UNIT/L (ref 14–36)
BARBITURATE SCN PRESENT UR: NEGATIVE
BASOPHILS # BLD AUTO: 0.05 X10(3)/MCL (ref 0.01–0.08)
BASOPHILS NFR BLD AUTO: 1 % (ref 0.1–1.2)
BENZODIAZ UR QL SCN: NEGATIVE
BILIRUB SERPL-MCNC: 0.4 MG/DL (ref 0–1)
BILIRUB UR QL STRIP.AUTO: NEGATIVE
BUN SERPL-MCNC: 43 MG/DL (ref 7–20)
CALCIUM SERPL-MCNC: 8.7 MG/DL (ref 8.4–10.2)
CANNABINOIDS UR QL SCN: NEGATIVE
CHLORIDE SERPL-SCNC: 105 MMOL/L (ref 98–110)
CLARITY UR: CLEAR
CO2 SERPL-SCNC: 22 MMOL/L (ref 21–32)
COCAINE UR QL SCN: NEGATIVE
COLOR UR AUTO: YELLOW
CREAT SERPL-MCNC: 2.02 MG/DL (ref 0.66–1.25)
CREAT/UREA NIT SERPL: 21 (ref 12–20)
EOSINOPHIL # BLD AUTO: 0.06 X10(3)/MCL (ref 0.04–0.36)
EOSINOPHIL NFR BLD AUTO: 1.2 % (ref 0.7–7)
ERYTHROCYTE [DISTWIDTH] IN BLOOD BY AUTOMATED COUNT: 12.7 % (ref 11–14.5)
ETHANOL BLD-MCNC: <0.01 G/DL
ETHANOL SERPL-MCNC: <10 MG/DL
GFR SERPLBLD CREATININE-BSD FMLA CKD-EPI: 25 ML/MIN/1.73/M2
GLOBULIN SER-MCNC: 3.3 GM/DL (ref 2–3.9)
GLUCOSE SERPL-MCNC: 175 MG/DL (ref 70–115)
GLUCOSE UR QL STRIP: NEGATIVE
HCT VFR BLD AUTO: 27.2 % (ref 36–48)
HGB BLD-MCNC: 8.7 G/DL (ref 11.8–16)
HGB UR QL STRIP: NEGATIVE
IMM GRANULOCYTES # BLD AUTO: 0.01 X10(3)/MCL (ref 0–0.03)
IMM GRANULOCYTES NFR BLD AUTO: 0.2 % (ref 0–0.5)
KETONES UR QL STRIP: NEGATIVE
LEUKOCYTE ESTERASE UR QL STRIP: NEGATIVE
LYMPHOCYTES # BLD AUTO: 1.51 X10(3)/MCL (ref 1.16–3.74)
LYMPHOCYTES NFR BLD AUTO: 30.3 % (ref 20–55)
MAGNESIUM SERPL-MCNC: 1.8 MG/DL (ref 1.8–2.4)
MCH RBC QN AUTO: 30.4 PG (ref 27–34)
MCHC RBC AUTO-ENTMCNC: 32 G/DL (ref 31–37)
MCV RBC AUTO: 95.1 FL (ref 79–99)
METHADONE UR QL SCN: NEGATIVE
MONOCYTES # BLD AUTO: 0.53 X10(3)/MCL (ref 0.24–0.36)
MONOCYTES NFR BLD AUTO: 10.6 % (ref 4.7–12.5)
NEUTROPHILS # BLD AUTO: 2.83 X10(3)/MCL (ref 1.56–6.13)
NEUTROPHILS NFR BLD AUTO: 56.7 % (ref 37–73)
NITRITE UR QL STRIP: NEGATIVE
NRBC BLD AUTO-RTO: 0 %
OPIATES UR QL SCN: POSITIVE
PCP UR QL: NEGATIVE
PH UR STRIP: 6 [PH]
PH UR: 6 [PH] (ref 3–11)
PLATELET # BLD AUTO: 164 X10(3)/MCL (ref 140–371)
PMV BLD AUTO: 10.5 FL (ref 9.4–12.4)
POCT GLUCOSE: 178 MG/DL (ref 70–110)
POTASSIUM SERPL-SCNC: 4.4 MMOL/L (ref 3.5–5.1)
PROT SERPL-MCNC: 7.1 GM/DL (ref 6.3–8.2)
PROT UR QL STRIP: 30
RBC # BLD AUTO: 2.86 X10(6)/MCL (ref 4–5.1)
SODIUM SERPL-SCNC: 136 MMOL/L (ref 136–145)
SP GR UR STRIP.AUTO: 1.02 (ref 1–1.03)
TSH SERPL-ACNC: 1.98 UIU/ML (ref 0.36–3.74)
UROBILINOGEN UR STRIP-ACNC: 0.2
WBC # SPEC AUTO: 4.99 X10(3)/MCL (ref 4–11.5)

## 2024-05-22 PROCEDURE — 25000003 PHARM REV CODE 250

## 2024-05-22 PROCEDURE — 80053 COMPREHEN METABOLIC PANEL: CPT

## 2024-05-22 PROCEDURE — 85025 COMPLETE CBC W/AUTO DIFF WBC: CPT

## 2024-05-22 PROCEDURE — 82077 ASSAY SPEC XCP UR&BREATH IA: CPT

## 2024-05-22 PROCEDURE — 82140 ASSAY OF AMMONIA: CPT

## 2024-05-22 PROCEDURE — 93005 ELECTROCARDIOGRAM TRACING: CPT

## 2024-05-22 PROCEDURE — 96360 HYDRATION IV INFUSION INIT: CPT

## 2024-05-22 PROCEDURE — 82962 GLUCOSE BLOOD TEST: CPT

## 2024-05-22 PROCEDURE — 80307 DRUG TEST PRSMV CHEM ANLYZR: CPT

## 2024-05-22 PROCEDURE — 99285 EMERGENCY DEPT VISIT HI MDM: CPT | Mod: 25

## 2024-05-22 PROCEDURE — 84443 ASSAY THYROID STIM HORMONE: CPT

## 2024-05-22 PROCEDURE — 81003 URINALYSIS AUTO W/O SCOPE: CPT | Mod: 59

## 2024-05-22 PROCEDURE — 93010 ELECTROCARDIOGRAM REPORT: CPT | Mod: ,,, | Performed by: INTERNAL MEDICINE

## 2024-05-22 PROCEDURE — 83735 ASSAY OF MAGNESIUM: CPT

## 2024-05-22 RX ADMIN — SODIUM CHLORIDE 1000 ML: 9 INJECTION, SOLUTION INTRAVENOUS at 08:05

## 2024-05-23 LAB
OHS QRS DURATION: 74 MS
OHS QTC CALCULATION: 410 MS

## 2024-05-23 NOTE — ED PROVIDER NOTES
"Encounter Date: 5/22/2024       History     Chief Complaint   Patient presents with    Weakness    Altered Mental Status     Pt presented to ED per POV with c/o generalized weakness onset last night into this morning. Pt stated "I just don't feel right". Pt has hx of CVA in the past.      78-year-old female drove herself to the emergency room with complaints of "just not feeling right".  She is complaining of some weakness.  There has been no change in her appetite.  She denies any nausea, vomiting or diarrhea.  She has had no fever or chills.  There has been no cough or congestion.  She denies any urinary problems.    The history is provided by the patient.     Review of patient's allergies indicates:   Allergen Reactions    Latex      Past Medical History:   Diagnosis Date    Diabetes mellitus     High cholesterol     History of right breast cancer     History of stomach cancer     Hypertension     Stroke      Past Surgical History:   Procedure Laterality Date    COLON SURGERY      COLONOSCOPY  10/05/2016    MASTECTOMY Right     TOTAL ABDOMINAL HYSTERECTOMY       Family History   Problem Relation Name Age of Onset    Breast cancer Sister      Uterine cancer Neg Hx      Ovarian cancer Neg Hx      Colon cancer Neg Hx       Social History     Tobacco Use    Smoking status: Former     Types: Cigarettes    Smokeless tobacco: Never   Substance Use Topics    Alcohol use: Never    Drug use: Never     Review of Systems   Constitutional:  Negative for fever.   HENT:  Negative for sore throat.    Respiratory:  Negative for shortness of breath.    Cardiovascular:  Negative for chest pain.   Gastrointestinal:  Negative for nausea.   Genitourinary:  Negative for dysuria.   Musculoskeletal:  Negative for back pain.   Skin:  Negative for rash.   Neurological:  Positive for weakness.   Hematological:  Does not bruise/bleed easily.   Psychiatric/Behavioral:  The patient is nervous/anxious.    All other systems reviewed and are " negative.      Physical Exam     Initial Vitals [05/22/24 1929]   BP Pulse Resp Temp SpO2   (!) 152/66 82 20 97.5 °F (36.4 °C) 99 %      MAP       --         Physical Exam    Nursing note and vitals reviewed.  Constitutional: Vital signs are normal. She appears well-developed and well-nourished. She is cooperative.   She is conversant without difficulty.   HENT:   Head: Normocephalic and atraumatic.   Mouth/Throat: Oropharynx is clear and moist.   Eyes: Conjunctivae, EOM and lids are normal. Pupils are equal, round, and reactive to light.   Neck: Trachea normal. Neck supple.   Normal range of motion.  Cardiovascular:  Normal rate, regular rhythm, normal heart sounds and intact distal pulses.           Pulmonary/Chest: Breath sounds normal.   Abdominal: Abdomen is soft. Bowel sounds are normal.   Musculoskeletal:         General: Normal range of motion.      Cervical back: Normal, normal range of motion and neck supple.      Lumbar back: Normal.     Neurological: She is alert and oriented to person, place, and time. She has normal strength. Coordination normal. GCS score is 15. GCS eye subscore is 4. GCS verbal subscore is 5. GCS motor subscore is 6.   Skin: Skin is warm, dry and intact. Capillary refill takes less than 2 seconds.   Psychiatric: Her speech is normal and behavior is normal. Judgment and thought content normal. Cognition and memory are normal.   She has a flat affect.         ED Course   Procedures  Labs Reviewed   COMPREHENSIVE METABOLIC PANEL - Abnormal; Notable for the following components:       Result Value    Glucose 175 (*)     Blood Urea Nitrogen 43 (*)     Creatinine 2.02 (*)     ALT 96 (*)      (*)     BUN/Creatinine Ratio 21 (*)     All other components within normal limits   URINALYSIS, REFLEX TO URINE CULTURE - Abnormal; Notable for the following components:    Protein, UA 30 (*)     All other components within normal limits    Narrative:      URINE STABILITY IS 2 HOURS AT ROOM TEMP  OR    SIX HOURS REFRIGERATED. PERFORMING TESTING ON    SPECIMENS GREATER THAN THIS AGE MAY AFFECT THE    FOLLOWING TESTS:    PH          SPECIFIC GRAVITY           BLOOD    CLARITY     BILIRUBIN               UROBILINOGEN   DRUG SCREEN, URINE (BEAKER) - Abnormal; Notable for the following components:    Opiates, Urine Positive (*)     All other components within normal limits    Narrative:     Cut off concentrations:    Amphetamines - 1000 ng/ml  Barbiturates - 200 ng/ml  Benzodiazepine - 200 ng/ml  Cannabinoids (THC) - 50 ng/ml  Cocaine - 300 ng/ml  Fentanyl - 1.0 ng/ml  MDMA - 500 ng/ml  Opiates - 300 ng/ml   Phencyclidine (PCP) - 25 ng/ml  Methadone - 300 ng/ml      False negatives may result form substances such as bleach added to urine.  False positives may result for the presence of a substance with similar chemical structure to the drug or its metabolite.    This test provides only a PRELIMINARY analytical test result. A more specific alternate chemical method must be used in order to obtain a confirmed analytical result. Gas chromatography/mass spectrometry (GC/MS) is the preferred confirmatory method. Other chemical confirmation methods are available. Clinical consideration and professional judgement should be applied to any drug of abuse test result, particularly when preliminary positive results are used.    Positive results will be confirmed only at the physicians request. Unconfirmed screening results are to be used only for medical purposes (treatment).          AMMONIA - Abnormal; Notable for the following components:    Ammonia Level <9.0 (*)     All other components within normal limits   CBC WITH DIFFERENTIAL - Abnormal; Notable for the following components:    RBC 2.86 (*)     Hgb 8.7 (*)     Hct 27.2 (*)     Mono # 0.53 (*)     All other components within normal limits   POCT GLUCOSE - Abnormal; Notable for the following components:    POCT Glucose 178 (*)     All other components within normal  limits   ALCOHOL,MEDICAL (ETHANOL) - Normal   TSH - Normal   MAGNESIUM - Normal   CBC W/ AUTO DIFFERENTIAL    Narrative:     The following orders were created for panel order CBC auto differential.  Procedure                               Abnormality         Status                     ---------                               -----------         ------                     CBC with Differential[[id number]]       Abnormal            Final result                 Please view results for these tests on the individual orders.        ECG Results              EKG 12-lead (Preliminary result)  Result time 05/22/24 20:03:04      Wet Read by Beny Hall MD (05/22/24 20:03:04, [facility], Emergency Medicine)    Normal sinus rhythm, heart rate 94, normal intervals, normal axis, normal P waves, normal QRS, normal ST segments, normal T-waves.                                  Imaging Results              CT Head Without Contrast (Final result)  Result time 05/22/24 20:17:10      Final result by Jarret Duarte MD (05/22/24 20:17:10)                   Impression:        1. Stable chronic findings as described above and previously seen.  No acute intracranial abnormality.    n/a    Category: n/a    The following dose reduction techniques are used for all CT at [facility]:    1.   Automated exposure control.    2.   Adjustment of the mA and/or kV according to patient size.    3.   Use of iterative reconstruction technique.      Electronically signed by: Jarret Duarte  Date:    05/22/2024  Time:    20:17               Narrative:    EXAMINATION:  CT HEAD WITHOUT CONTRAST    CLINICAL HISTORY:  Mental status change, unknown cause;    TECHNIQUE:  Low dose axial images were obtained through the head.  Coronal and sagittal reformations were also performed. Contrast was not administered.    COMPARISON:  10/12/2023    FINDINGS:  Multiplanar imaging through the head/brain was  "performed.Moderate chronic ischemic disease with mild generalized atrophy again noted having a similar appearance to the previous study.  I see no intraparynchymal masses, hemorhagic lesions, or dominant wedge shaped infarcts. I see no extraxial masses or abnormal fluid collections.                                       Medications   sodium chloride 0.9% bolus 1,000 mL 1,000 mL (1,000 mLs Intravenous New Bag 5/22/24 2005)     Medical Decision Making  Weakness, anxiety, "not feeling right".  Differential diagnosis:  Dehydration, electrolyte imbalance, occult infection, lacunar infarct  Labs, EKG, CT head  IV fluids    Amount and/or Complexity of Data Reviewed  Labs: ordered. Decision-making details documented in ED Course.  Radiology: ordered. Decision-making details documented in ED Course.               ED Course as of 05/22/24 2102   Wed May 22, 2024   2005 CBC auto differential(!)  Patient is pretty anemic, but CBC is at baseline versus 8 and 11 months ago.  There is no leukocytosis. [TM]   2020 Urinalysis, Reflex to Urine Culture(!)  There is no evidence of a UTI on the dipstick [TM]   2021 CT Head Without Contrast  There is no acute intracranial abnormality [TM]   2101 Comprehensive metabolic panel(!)  BUN and creatinine a bit worse than previous [TM]      ED Course User Index  [TM] Beny Hall MD                           Clinical Impression:  Final diagnoses:  [R53.1] Weakness  [E86.0] Dehydration, mild (Primary)          ED Disposition Condition    Discharge Good          ED Prescriptions    None       Follow-up Information       Follow up With Specialties Details Why Contact Info    Sylvain Ackerman MD Internal Medicine, General Practice Call in 1 day  [address]  [address] [facility] [address]  [phone]               Beny Hall MD  05/22/24 2103    "

## 2024-05-27 ENCOUNTER — HOSPITAL ENCOUNTER (EMERGENCY)
Facility: HOSPITAL | Age: 79
Discharge: HOME | End: 2024-05-27
Attending: FAMILY MEDICINE
Payer: MEDICARE

## 2024-05-27 VITALS
BODY MASS INDEX: 29.32 KG/M2 | OXYGEN SATURATION: 99 % | HEIGHT: 65 IN | DIASTOLIC BLOOD PRESSURE: 69 MMHG | HEART RATE: 88 BPM | SYSTOLIC BLOOD PRESSURE: 156 MMHG | TEMPERATURE: 98 F | WEIGHT: 176 LBS | RESPIRATION RATE: 19 BRPM

## 2024-05-27 DIAGNOSIS — R53.1 GENERALIZED WEAKNESS: ICD-10-CM

## 2024-05-27 DIAGNOSIS — E86.0 DEHYDRATION: Primary | ICD-10-CM

## 2024-05-27 LAB
ALBUMIN SERPL-MCNC: 4.1 G/DL (ref 3.4–5)
ALBUMIN/GLOB SERPL: 1.2 RATIO
ALP SERPL-CCNC: 90 UNIT/L (ref 50–144)
ALT SERPL-CCNC: 93 UNIT/L (ref 1–45)
ANION GAP SERPL CALC-SCNC: 10 MEQ/L (ref 2–13)
ANION GAP SERPL CALC-SCNC: 15 MEQ/L (ref 2–13)
AST SERPL-CCNC: 121 UNIT/L (ref 14–36)
BACTERIA #/AREA URNS AUTO: ABNORMAL /HPF
BASOPHILS # BLD AUTO: 0.04 X10(3)/MCL (ref 0.01–0.08)
BASOPHILS NFR BLD AUTO: 0.8 % (ref 0.1–1.2)
BILIRUB SERPL-MCNC: 0.4 MG/DL (ref 0–1)
BILIRUB UR QL STRIP.AUTO: NEGATIVE
BNP BLD-MCNC: 64.6 PG/ML (ref 0–124.9)
BUN SERPL-MCNC: 31 MG/DL (ref 7–20)
BUN SERPL-MCNC: 34 MG/DL (ref 7–20)
CALCIUM SERPL-MCNC: 8.6 MG/DL (ref 8.4–10.2)
CALCIUM SERPL-MCNC: 9 MG/DL (ref 8.4–10.2)
CHLORIDE SERPL-SCNC: 105 MMOL/L (ref 98–110)
CHLORIDE SERPL-SCNC: 109 MMOL/L (ref 98–110)
CLARITY UR: CLEAR
CO2 SERPL-SCNC: 13 MMOL/L (ref 21–32)
CO2 SERPL-SCNC: 21 MMOL/L (ref 21–32)
COLOR UR AUTO: YELLOW
CREAT SERPL-MCNC: 2 MG/DL (ref 0.66–1.25)
CREAT SERPL-MCNC: 2.16 MG/DL (ref 0.66–1.25)
CREAT/UREA NIT SERPL: 16 (ref 12–20)
CREAT/UREA NIT SERPL: 16 (ref 12–20)
EOSINOPHIL # BLD AUTO: 0.07 X10(3)/MCL (ref 0.04–0.36)
EOSINOPHIL NFR BLD AUTO: 1.5 % (ref 0.7–7)
ERYTHROCYTE [DISTWIDTH] IN BLOOD BY AUTOMATED COUNT: 12.9 % (ref 11–14.5)
GFR SERPLBLD CREATININE-BSD FMLA CKD-EPI: 23 ML/MIN/1.73/M2
GFR SERPLBLD CREATININE-BSD FMLA CKD-EPI: 25 ML/MIN/1.73/M2
GLOBULIN SER-MCNC: 3.5 GM/DL (ref 2–3.9)
GLUCOSE SERPL-MCNC: 109 MG/DL (ref 70–115)
GLUCOSE SERPL-MCNC: 182 MG/DL (ref 70–115)
GLUCOSE UR QL STRIP: NEGATIVE
HCT VFR BLD AUTO: 29.5 % (ref 36–48)
HGB BLD-MCNC: 9.4 G/DL (ref 11.8–16)
HGB UR QL STRIP: ABNORMAL
IMM GRANULOCYTES # BLD AUTO: 0.01 X10(3)/MCL (ref 0–0.03)
IMM GRANULOCYTES NFR BLD AUTO: 0.2 % (ref 0–0.5)
INFLUENZA A (OHS): NEGATIVE
INFLUENZA B (OHS): NEGATIVE
KETONES UR QL STRIP: NEGATIVE
LEUKOCYTE ESTERASE UR QL STRIP: NEGATIVE
LYMPHOCYTES # BLD AUTO: 1.22 X10(3)/MCL (ref 1.16–3.74)
LYMPHOCYTES NFR BLD AUTO: 25.5 % (ref 20–55)
MAGNESIUM SERPL-MCNC: 1.8 MG/DL (ref 1.8–2.4)
MCH RBC QN AUTO: 30.4 PG (ref 27–34)
MCHC RBC AUTO-ENTMCNC: 31.9 G/DL (ref 31–37)
MCV RBC AUTO: 95.5 FL (ref 79–99)
MONOCYTES # BLD AUTO: 0.42 X10(3)/MCL (ref 0.24–0.36)
MONOCYTES NFR BLD AUTO: 8.8 % (ref 4.7–12.5)
NEUTROPHILS # BLD AUTO: 3.02 X10(3)/MCL (ref 1.56–6.13)
NEUTROPHILS NFR BLD AUTO: 63.2 % (ref 37–73)
NITRITE UR QL STRIP: NEGATIVE
NRBC BLD AUTO-RTO: 0 %
PH UR STRIP: 7 [PH]
PLATELET # BLD AUTO: 179 X10(3)/MCL (ref 140–371)
PMV BLD AUTO: 10.8 FL (ref 9.4–12.4)
POTASSIUM SERPL-SCNC: 4.3 MMOL/L (ref 3.5–5.1)
POTASSIUM SERPL-SCNC: 4.4 MMOL/L (ref 3.5–5.1)
PROT SERPL-MCNC: 7.6 GM/DL (ref 6.3–8.2)
PROT UR QL STRIP: ABNORMAL
RBC # BLD AUTO: 3.09 X10(6)/MCL (ref 4–5.1)
RBC #/AREA URNS AUTO: ABNORMAL /HPF
SARS-COV-2 RDRP RESP QL NAA+PROBE: NEGATIVE
SODIUM SERPL-SCNC: 136 MMOL/L (ref 136–145)
SODIUM SERPL-SCNC: 137 MMOL/L (ref 136–145)
SP GR UR STRIP.AUTO: 1.01 (ref 1–1.03)
SQUAMOUS #/AREA URNS AUTO: ABNORMAL /HPF
TROPONIN I SERPL-MCNC: <0.012 NG/ML (ref 0–0.03)
UROBILINOGEN UR STRIP-ACNC: 0.2
WBC # SPEC AUTO: 4.78 X10(3)/MCL (ref 4–11.5)
WBC #/AREA URNS AUTO: ABNORMAL /HPF

## 2024-05-27 PROCEDURE — 99284 EMERGENCY DEPT VISIT MOD MDM: CPT | Mod: 25

## 2024-05-27 PROCEDURE — 96361 HYDRATE IV INFUSION ADD-ON: CPT

## 2024-05-27 PROCEDURE — 87400 INFLUENZA A/B EACH AG IA: CPT | Performed by: FAMILY MEDICINE

## 2024-05-27 PROCEDURE — 80053 COMPREHEN METABOLIC PANEL: CPT | Performed by: FAMILY MEDICINE

## 2024-05-27 PROCEDURE — U0002 COVID-19 LAB TEST NON-CDC: HCPCS | Performed by: FAMILY MEDICINE

## 2024-05-27 PROCEDURE — 84484 ASSAY OF TROPONIN QUANT: CPT | Performed by: FAMILY MEDICINE

## 2024-05-27 PROCEDURE — 85025 COMPLETE CBC W/AUTO DIFF WBC: CPT | Performed by: FAMILY MEDICINE

## 2024-05-27 PROCEDURE — 96360 HYDRATION IV INFUSION INIT: CPT

## 2024-05-27 PROCEDURE — 83880 ASSAY OF NATRIURETIC PEPTIDE: CPT | Performed by: FAMILY MEDICINE

## 2024-05-27 PROCEDURE — 81001 URINALYSIS AUTO W/SCOPE: CPT | Performed by: FAMILY MEDICINE

## 2024-05-27 PROCEDURE — 83735 ASSAY OF MAGNESIUM: CPT | Performed by: FAMILY MEDICINE

## 2024-05-27 PROCEDURE — 25000003 PHARM REV CODE 250: Performed by: FAMILY MEDICINE

## 2024-05-27 RX ADMIN — SODIUM CHLORIDE 1000 ML: 9 INJECTION, SOLUTION INTRAVENOUS at 05:05

## 2024-05-27 RX ADMIN — SODIUM CHLORIDE 1000 ML: 9 INJECTION, SOLUTION INTRAVENOUS at 02:05

## 2024-05-27 NOTE — ED PROVIDER NOTES
"Encounter Date: 5/27/2024       History     Chief Complaint   Patient presents with    Weakness    Dizziness     Pt reports she was here last weak for the same symptoms and was told she was dehydrated but she does not feel any better. States "I just feel awful" c/o weakness and dizziness.     Patient presents for evaluation of fatigue and malaise.  Patient notes not feeling herself for the past week or proximally.  Patient notes fatigue is profound and she has not able to function normally.  Patient denies having any pain shortness of breath or any other associated symptoms at present.  Patient denies having any aggravating or relieving features.    The history is provided by the patient.     Review of patient's allergies indicates:   Allergen Reactions    Latex      Past Medical History:   Diagnosis Date    Diabetes mellitus     High cholesterol     History of right breast cancer     History of stomach cancer     Hypertension     Stroke      Past Surgical History:   Procedure Laterality Date    COLON SURGERY      COLONOSCOPY  10/05/2016    MASTECTOMY Right     TOTAL ABDOMINAL HYSTERECTOMY       Family History   Problem Relation Name Age of Onset    Breast cancer Sister      Uterine cancer Neg Hx      Ovarian cancer Neg Hx      Colon cancer Neg Hx       Social History     Tobacco Use    Smoking status: Former     Types: Cigarettes    Smokeless tobacco: Never   Substance Use Topics    Alcohol use: Never    Drug use: Never     Review of Systems   Constitutional:  Positive for fatigue.   HENT: Negative.     Eyes: Negative.    Respiratory: Negative.     Cardiovascular: Negative.    Gastrointestinal: Negative.    Endocrine: Negative.    Genitourinary: Negative.    Musculoskeletal: Negative.    Skin: Negative.    Allergic/Immunologic: Negative.    Neurological: Negative.    Hematological: Negative.        Physical Exam     Initial Vitals [05/27/24 1321]   BP Pulse Resp Temp SpO2   (!) 140/66 93 20 97.5 °F (36.4 °C) 100 % "      MAP       --         Physical Exam    Vitals reviewed.  Constitutional: She appears well-developed and well-nourished. She is not diaphoretic. No distress.   HENT:   Head: Normocephalic and atraumatic.   Mouth/Throat: No oropharyngeal exudate.   Eyes: EOM are normal. Pupils are equal, round, and reactive to light. Right eye exhibits no discharge. Left eye exhibits no discharge.   Neck: Neck supple. No thyromegaly present. No tracheal deviation present. No JVD present.   Normal range of motion.  Cardiovascular:  Normal rate, regular rhythm and normal heart sounds.     Exam reveals no gallop and no friction rub.       No murmur heard.  Pulmonary/Chest: Breath sounds normal. No stridor. No respiratory distress. She has no wheezes. She has no rhonchi. She has no rales. She exhibits no tenderness.   Abdominal: Abdomen is soft. Bowel sounds are normal. She exhibits no distension. There is no abdominal tenderness. There is no rebound and no guarding.   Musculoskeletal:         General: No tenderness or edema. Normal range of motion.      Cervical back: Normal range of motion and neck supple.     Neurological: She is alert and oriented to person, place, and time. She has normal reflexes. She displays normal reflexes. No cranial nerve deficit. GCS score is 15. GCS eye subscore is 4. GCS verbal subscore is 5. GCS motor subscore is 6.   Skin: Skin is warm. No erythema.   Psychiatric: She has a normal mood and affect.         ED Course   Procedures  Labs Reviewed   COMPREHENSIVE METABOLIC PANEL - Abnormal; Notable for the following components:       Result Value    Glucose 182 (*)     Blood Urea Nitrogen 34 (*)     Creatinine 2.16 (*)     ALT 93 (*)      (*)     All other components within normal limits   URINALYSIS - Abnormal; Notable for the following components:    Protein, UA Trace (*)     Blood, UA Trace-Intact (*)     All other components within normal limits    Narrative:      URINE STABILITY IS 2 HOURS AT  ROOM TEMP OR    SIX HOURS REFRIGERATED. PERFORMING TESTING ON    SPECIMENS GREATER THAN THIS AGE MAY AFFECT THE    FOLLOWING TESTS:    PH          SPECIFIC GRAVITY           BLOOD    CLARITY     BILIRUBIN               UROBILINOGEN   CBC WITH DIFFERENTIAL - Abnormal; Notable for the following components:    RBC 3.09 (*)     Hgb 9.4 (*)     Hct 29.5 (*)     Mono # 0.42 (*)     All other components within normal limits   URINALYSIS, MICROSCOPIC - Abnormal; Notable for the following components:    Squamous Epithelial Cells, UA Few (*)     All other components within normal limits   BASIC METABOLIC PANEL - Abnormal; Notable for the following components:    CO2 13 (*)     Blood Urea Nitrogen 31 (*)     Creatinine 2.00 (*)     Anion Gap 15.0 (*)     All other components within normal limits   RAPID INFLUENZA A/B - Normal   MAGNESIUM - Normal   SARS-COV-2 RNA AMPLIFICATION, QUAL - Normal    Narrative:     The IDNOW COVID-19 assay is a rapid molecular in vitro diagnostic test utilizing an isothermal nucleic acid amplification technology intended for the qualitative detection of nucleic acid from the SARS-CoV-2 viral RNA in direct nasal, nasopharyngeal or throat swabs from individuals who are suspected of COVID-19 by their healthcare provider.   NT-PRO NATRIURETIC PEPTIDE - Normal   TROPONIN I - Normal   CBC W/ AUTO DIFFERENTIAL    Narrative:     The following orders were created for panel order CBC auto differential.  Procedure                               Abnormality         Status                     ---------                               -----------         ------                     CBC with Differential[[id number]]       Abnormal            Final result                 Please view results for these tests on the individual orders.          Imaging Results              CT Abdomen Pelvis  Without Contrast (Final result)  Result time 05/27/24 15:25:38      Final result by Michael Dumont MD (05/27/24 15:25:38)                    Impression:      1. No evidence of an acute process in the abdomen and pelvis.  2. Moderate stool burden.  3. Stable chronic changes as mentioned above.      Electronically signed by: Michael Dumont  Date:    05/27/2024  Time:    15:25               Narrative:    EXAMINATION:  CT ABDOMEN PELVIS WITHOUT CONTRAST    CLINICAL HISTORY:  Malaise and fatigue;    TECHNIQUE:  Axial CT images were obtained through the abdomen and pelvis without IV contrast.  Coronal and sagittal reconstructions submitted and interpreted. Automated exposure control utilized limiting radiation dose to the patient. DLP: 376.5 mGy-cm    COMPARISON:  CT abdomen pelvis 09/06/2023.    FINDINGS:  Visualized Thorax: Scattered calcified granulomas.  Calcified right hilar lymph nodes identified.  Heart is normal in size.  Coronary artery calcifications are noted..    Liver, Gallbladder, and Biliary System: The liver is normal in size and attenuation. There is no intra or extrahepatic biliary ductal dilatation. The gallbladder is normal in appearance with no evidence of stones or sludge.    Spleen: Splenic granulomas noted.  Spleen is normal in size.    Pancreas: No inflammatory changes or ductal dilatation.    Adrenal Glands: Normal.    Kidneys, Ureter, Bladder: Bilateral pelviectasis.  The kidneys are otherwise normal in appearance with no evidence of stones or hydronephrosis.  The ureters are normal in caliber.  No bladder wall thickening.    Peritoneum: No free fluid or free air.    GI Tract: Moderate stool burden.  The no evidence of an obstructive process or inflammatory changes.  The appendix is not visualized however there are no inflammatory changes to suggest acute appendicitis.    Reproductive Organs: Postsurgical changes of hysterectomy.    Lymph Nodes: No pathologically enlarged lymph nodes are identified.    Vascular: The visualized aorta is normal in caliber with advanced atherosclerotic disease.    Bones: Unchanged compression  fracture deformity of the L2 vertebral body.  Severe multilevel degenerative changes of the spine are present. No acute osseous findings.    Soft Tissues: Large bowel containing anterior abdominal wall hernia with a fascial defect measuring approximately 7 x 6.3 cm.                                       Medications   sodium chloride 0.9% bolus 1,000 mL 1,000 mL (1,000 mLs Intravenous New Bag 5/27/24 1712)   sodium chloride 0.9% bolus 1,000 mL 1,000 mL (0 mLs Intravenous Stopped 5/27/24 1533)     Medical Decision Making  Amount and/or Complexity of Data Reviewed  Labs: ordered.  Radiology: ordered.                                      Clinical Impression:  Final diagnoses:  [E86.0] Dehydration (Primary)  [R53.1] Generalized weakness          ED Disposition Condition    Discharge Good          ED Prescriptions    None       Follow-up Information       Follow up With Specialties Details Why Contact Info    Sylvain Ackerman MD Internal Medicine, General Practice Call in 1 day  [address] [facility] [facility]  [address]  [place] [address]  [phone]               Beny Hall MD  05/27/24 6640

## 2024-06-03 ENCOUNTER — HOSPITAL ENCOUNTER (EMERGENCY)
Facility: HOSPITAL | Age: 79
Discharge: HOME | End: 2024-06-03
Payer: MEDICARE

## 2024-06-03 VITALS
HEIGHT: 65 IN | OXYGEN SATURATION: 100 % | WEIGHT: 176 LBS | TEMPERATURE: 98 F | RESPIRATION RATE: 16 BRPM | DIASTOLIC BLOOD PRESSURE: 64 MMHG | BODY MASS INDEX: 29.32 KG/M2 | SYSTOLIC BLOOD PRESSURE: 130 MMHG | HEART RATE: 92 BPM

## 2024-06-03 DIAGNOSIS — R51.9 ACUTE NONINTRACTABLE HEADACHE, UNSPECIFIED HEADACHE TYPE: Primary | ICD-10-CM

## 2024-06-03 DIAGNOSIS — R52 PAIN: ICD-10-CM
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
## 2024-06-03 DIAGNOSIS — S30.0XXA CONTUSION OF COCCYX, INITIAL ENCOUNTER: ICD-10-CM

## 2024-06-03 PROCEDURE — 25000003 PHARM REV CODE 250

## 2024-06-03 PROCEDURE — 63600175 PHARM REV CODE 636 W HCPCS

## 2024-06-03 PROCEDURE — 96372 THER/PROPH/DIAG INJ SC/IM: CPT

## 2024-06-03 PROCEDURE — 99285 EMERGENCY DEPT VISIT HI MDM: CPT | Mod: 25

## 2024-06-03 RX ORDER — HYDROCODONE BITARTRATE AND ACETAMINOPHEN 10; 325 MG/1; MG/1
1 TABLET ORAL
Status: COMPLETED | OUTPATIENT
Start: 2024-06-03 | End: 2024-06-03

## 2024-06-03 RX ORDER — KETOROLAC TROMETHAMINE 30 MG/ML
30 INJECTION, SOLUTION INTRAMUSCULAR; INTRAVENOUS
Status: COMPLETED | OUTPATIENT
Start: 2024-06-03 | End: 2024-06-03

## 2024-06-03 RX ORDER — HYDROXYZINE 50 MG/ML
50 INJECTION, SOLUTION INTRAMUSCULAR ONCE
Status: COMPLETED | OUTPATIENT
Start: 2024-06-03 | End: 2024-06-03

## 2024-06-03 RX ADMIN — KETOROLAC TROMETHAMINE 30 MG: 30 INJECTION, SOLUTION INTRAMUSCULAR at 10:06

## 2024-06-03 RX ADMIN — HYDROXYZINE HYDROCHLORIDE 50 MG: 50 INJECTION, SOLUTION INTRAMUSCULAR at 10:06

## 2024-06-03 RX ADMIN — HYDROCODONE BITARTRATE AND ACETAMINOPHEN 1 TABLET: 10; 325 TABLET ORAL at 10:06

## 2024-06-04 NOTE — PROVIDER PROGRESS NOTES - EMERGENCY DEPT.
Encounter Date: 6/3/2024    Informed Dr. Peralta of patient's status, patient awaiting sacrum and coccyx x-ray and CT head without contrast.

## 2024-06-04 NOTE — ED PROVIDER NOTES
Encounter Date: 6/3/2024       History     Chief Complaint   Patient presents with    Headache    Weakness    Tailbone Pain     Pt presented to ED per POV with c/o headache and tailbone pain from fall on 3 days ago. Pt reports hitting head. Pt reports she was seen Emergency room on Sat and received fluids and sent home. Pt denies taking blood thinners.     78-year-old female complains of headache and tailbone pain, ever since falling a couple of days ago.  Patient's care was transferred to me from the nurse practitioner.  CT was read as no acute abnormality.  She had a x-ray of her sacrum and coccyx, which appears normal.        Review of patient's allergies indicates:   Allergen Reactions    Latex      Past Medical History:   Diagnosis Date    Diabetes mellitus     High cholesterol     History of right breast cancer     History of stomach cancer     Hypertension     Stroke      Past Surgical History:   Procedure Laterality Date    COLON SURGERY      COLONOSCOPY  10/05/2016    MASTECTOMY Right     TOTAL ABDOMINAL HYSTERECTOMY       Family History   Problem Relation Name Age of Onset    Breast cancer Sister      Uterine cancer Neg Hx      Ovarian cancer Neg Hx      Colon cancer Neg Hx       Social History     Tobacco Use    Smoking status: Former     Types: Cigarettes    Smokeless tobacco: Never   Substance Use Topics    Alcohol use: Never    Drug use: Never     Review of Systems   Constitutional:  Negative for fever.   HENT:  Negative for sore throat.    Respiratory:  Negative for shortness of breath.    Cardiovascular:  Negative for chest pain.   Gastrointestinal:  Negative for nausea.   Genitourinary:  Negative for dysuria.   Musculoskeletal:  Negative for back pain.        Pain in area of tailbone   Skin:  Negative for rash.   Neurological:  Positive for headaches. Negative for weakness.   Hematological:  Does not bruise/bleed easily.       Physical Exam     Initial Vitals [06/03/24 2046]   BP Pulse Resp Temp  SpO2   (!) 130/56 94 18 97.7 °F (36.5 °C) 99 %      MAP       --         Physical Exam    Constitutional: Vital signs are normal. She appears well-developed and well-nourished. She is cooperative.   HENT:   Head: Normocephalic and atraumatic.   Mouth/Throat: Oropharynx is clear and moist.   Eyes: Conjunctivae, EOM and lids are normal. Pupils are equal, round, and reactive to light.   Neck: Trachea normal. Neck supple.   Normal range of motion.  Cardiovascular:  Normal rate, regular rhythm, normal heart sounds and intact distal pulses.           Pulmonary/Chest: Breath sounds normal.   Abdominal: Abdomen is soft. Bowel sounds are normal.   Musculoskeletal:         General: Normal range of motion.      Cervical back: Normal, normal range of motion and neck supple.      Lumbar back: Normal.     Neurological: She is alert and oriented to person, place, and time. She has normal strength. Coordination normal. GCS score is 15. GCS eye subscore is 4. GCS verbal subscore is 5. GCS motor subscore is 6.   Skin: Skin is warm, dry and intact. Capillary refill takes less than 2 seconds.   Psychiatric: She has a normal mood and affect. Her speech is normal and behavior is normal. Judgment and thought content normal. Cognition and memory are normal.         ED Course   Procedures  Labs Reviewed - No data to display       Imaging Results              CT Head Without Contrast (Final result)  Result time 06/03/24 22:12:29      Final result by Jarret Duarte MD (06/03/24 22:12:29)                   Impression:        1. Stable chronic findings as described above and previously seen.  No acute intracranial abnormality.    n/a    Category: n/a    The following dose reduction techniques are used for all CT at [facility]:    1.   Automated exposure control.    2.   Adjustment of the mA and/or kV according to patient size.    3.   Use of iterative reconstruction technique.      Electronically signed by: Jarret  Gerald  Date:    06/03/2024  Time:    22:12               Narrative:    EXAMINATION:  CT HEAD WITHOUT CONTRAST    CLINICAL HISTORY:  Headache, new or worsening (Age >= 50y);    TECHNIQUE:  Low dose axial images were obtained through the head.  Coronal and sagittal reformations were also performed. Contrast was not administered.    COMPARISON:  05/22/2024    FINDINGS:  Multiplanar imaging through the head/brain was performed.Mild generalized atrophy is present with moderate chronic ischemic disease involving the periventricular deep white matter on both sides, having a similar appearance to the prior study.  I see no intraparynchymal masses, hemorhagic lesions, or dominant wedge shaped infarcts. I see no extraxial masses or abnormal fluid collections.                                       X-Ray Sacrum And Coccyx (Final result)  Result time 06/03/24 22:33:26      Final result by Denis Fung MD (06/03/24 22:33:26)                   Impression:      No acute abnormalities are seen      Electronically signed by: Denis Fung MD  Date:    06/03/2024  Time:    22:33               Narrative:    EXAMINATION:  XR SACRUM AND COCCYX    CLINICAL HISTORY:  Unspecified fall, initial encounter    TECHNIQUE:  Two or three views of the sacrum and coccyx were performed.    COMPARISON:  None    FINDINGS:  There are no fractures seen.  There are no bony lesions noted.                        Wet Read by Beny Hall MD (06/03/24 22:26:52, [facility], Emergency Medicine)    No visible fracture, normal alignment                                     Medications   ketorolac injection 30 mg (has no administration in time range)   hydrOXYzine injection 50 mg (has no administration in time range)   HYDROcodone-acetaminophen  mg per tablet 1 tablet (has no administration in time range)     Medical Decision Making  Amount and/or Complexity of Data Reviewed  Radiology: ordered and independent  interpretation performed.                                      Clinical Impression:  Final diagnoses:  [R52] Pain  [W19.XXXA] Fall  [R51.9] Acute nonintractable headache, unspecified headache type (Primary)  [S30.0XXA] Contusion of coccyx, initial encounter          ED Disposition Condition    Discharge Good          ED Prescriptions    None       Follow-up Information       Follow up With Specialties Details Why Contact Info    Sylvain Ackerman MD Internal Medicine, General Practice Call in 1 day  [address]  [address] [facility] [address]  [phone]               Beny Hall MD  06/03/24 0429

## 2024-06-04 NOTE — ED PROVIDER NOTES
"Encounter Date: 6/3/2024       History     Chief Complaint   Patient presents with    Headache    Weakness    Tailbone Pain     Pt presented to ED per POV with c/o headache and tailbone pain from fall on 3 days ago. Pt reports hitting head. Pt reports she was seen Emergency room on Sat and received fluids and sent home. Pt denies taking blood thinners.     This 78-year-old female patient presents with complaints of headache, she denies a history of headaches and states this is the worst headache she has ever had.  She is also complaining of tailbone pain after a fall on Saturday when she was discharged from the emergency room.  Her visit in the emergency room was for dizziness and she was treated for dehydration and discharged home.  Patient states she drove herself earlier today to go exercise but did not stay because she started feeling bad and having a headache.      Review of patient's allergies indicates:   Allergen Reactions    Latex      Past Medical History:   Diagnosis Date    Diabetes mellitus     High cholesterol     History of right breast cancer     History of stomach cancer     Hypertension     Stroke      Past Surgical History:   Procedure Laterality Date    COLON SURGERY      COLONOSCOPY  10/05/2016    MASTECTOMY Right     TOTAL ABDOMINAL HYSTERECTOMY       Family History   Problem Relation Name Age of Onset    Breast cancer Sister      Uterine cancer Neg Hx      Ovarian cancer Neg Hx      Colon cancer Neg Hx       Social History     Tobacco Use    Smoking status: Former     Types: Cigarettes    Smokeless tobacco: Never   Substance Use Topics    Alcohol use: Never    Drug use: Never     Review of Systems   Musculoskeletal:         "Tailbone pain"   Neurological:  Positive for headaches.   All other systems reviewed and are negative.      Physical Exam     Initial Vitals [06/03/24 2046]   BP Pulse Resp Temp SpO2   (!) 130/56 94 18 97.7 °F (36.5 °C) 99 %      MAP       --         Physical " Exam    Nursing note and vitals reviewed.  Constitutional: She appears well-developed and well-nourished.   HENT:   Head: Normocephalic and atraumatic.   Eyes: Pupils are equal, round, and reactive to light.   Neck:   Normal range of motion.  Cardiovascular:  Normal rate, regular rhythm and normal heart sounds.           Pulmonary/Chest: Breath sounds normal.   Musculoskeletal:         General: Normal range of motion.      Cervical back: Normal range of motion.     Neurological: She is alert and oriented to person, place, and time.   Slow to answer questions and difficulty with memory recall   Skin: Skin is warm and dry. Capillary refill takes less than 2 seconds.   Psychiatric: She has a normal mood and affect. Her behavior is normal. Judgment and thought content normal.         ED Course   Procedures  Labs Reviewed - No data to display       Imaging Results              X-Ray Sacrum And Coccyx (In process)                      CT Head Without Contrast (In process)                      Medications - No data to display  Medical Decision Making  This 78-year-old female patient presents with complaints of headache, she denies a history of headaches and states this is the worst headache she has ever had.  She is also complaining of tailbone pain after a fall on Saturday when she was discharged from the emergency room.  Her visit in the emergency room was for dizziness and she was treated for dehydration and discharged home.  Patient states she drove herself earlier today to go exercise but did not stay because she started feeling bad and having a headache.     Amount and/or Complexity of Data Reviewed  Radiology: ordered.                                      Clinical Impression:  Final diagnoses:  [R52] Pain  [W19.XXXA] Jaqueline Phillips, [facility]  06/03/24 8524

## 2024-06-07 ENCOUNTER — HOSPITAL ENCOUNTER (INPATIENT)
Facility: HOSPITAL | Age: 79
LOS: 1 days | Discharge: HOME | DRG: 683 | End: 2024-06-08
Attending: FAMILY MEDICINE | Admitting: INTERNAL MEDICINE
Payer: MEDICARE

## 2024-06-07 DIAGNOSIS — R53.81 MALAISE: ICD-10-CM

## 2024-06-07 DIAGNOSIS — E87.20 METABOLIC ACIDOSIS: ICD-10-CM

## 2024-06-07 DIAGNOSIS — I95.1 ORTHOSTATIC HYPOTENSION: ICD-10-CM

## 2024-06-07 DIAGNOSIS — R07.9 CHEST PAIN: ICD-10-CM

## 2024-06-07 DIAGNOSIS — N18.9 ACUTE RENAL FAILURE SUPERIMPOSED ON CHRONIC KIDNEY DISEASE, UNSPECIFIED ACUTE RENAL FAILURE TYPE, UNSPECIFIED CKD STAGE: ICD-10-CM

## 2024-06-07 DIAGNOSIS — R42 DIZZINESS: Primary | ICD-10-CM

## 2024-06-07 DIAGNOSIS — D64.9 NORMOCYTIC ANEMIA: ICD-10-CM

## 2024-06-07 DIAGNOSIS — R74.01 TRANSAMINITIS: ICD-10-CM

## 2024-06-07 DIAGNOSIS — N17.9 ACUTE RENAL FAILURE SUPERIMPOSED ON CHRONIC KIDNEY DISEASE, UNSPECIFIED ACUTE RENAL FAILURE TYPE, UNSPECIFIED CKD STAGE: ICD-10-CM

## 2024-06-07 PROBLEM — N18.32 ACUTE RENAL FAILURE SUPERIMPOSED ON STAGE 3B CHRONIC KIDNEY DISEASE: Status: ACTIVE | Noted: 2024-06-07

## 2024-06-07 LAB
ALBUMIN SERPL-MCNC: 3.8 G/DL (ref 3.4–5)
ALBUMIN/GLOB SERPL: 1.2 RATIO
ALP SERPL-CCNC: 102 UNIT/L (ref 50–144)
ALT SERPL-CCNC: 164 UNIT/L (ref 1–45)
ANION GAP SERPL CALC-SCNC: 11 MEQ/L (ref 2–13)
AST SERPL-CCNC: 364 UNIT/L (ref 14–36)
BACTERIA #/AREA URNS AUTO: NORMAL /HPF
BASOPHILS # BLD AUTO: 0.02 X10(3)/MCL (ref 0.01–0.08)
BASOPHILS NFR BLD AUTO: 0.4 % (ref 0.1–1.2)
BILIRUB SERPL-MCNC: 0.5 MG/DL (ref 0–1)
BILIRUB UR QL STRIP.AUTO: NEGATIVE
BNP BLD-MCNC: 103 PG/ML (ref 0–124.9)
BUN SERPL-MCNC: 41 MG/DL (ref 7–20)
CALCIUM SERPL-MCNC: 8.7 MG/DL (ref 8.4–10.2)
CHLORIDE SERPL-SCNC: 107 MMOL/L (ref 98–110)
CLARITY UR: CLEAR
CO2 SERPL-SCNC: 19 MMOL/L (ref 21–32)
COLOR UR AUTO: YELLOW
CREAT SERPL-MCNC: 2.55 MG/DL (ref 0.66–1.25)
CREAT/UREA NIT SERPL: 16 (ref 12–20)
EOSINOPHIL # BLD AUTO: 0.06 X10(3)/MCL (ref 0.04–0.36)
EOSINOPHIL NFR BLD AUTO: 1.2 % (ref 0.7–7)
ERYTHROCYTE [DISTWIDTH] IN BLOOD BY AUTOMATED COUNT: 13.1 % (ref 11–14.5)
GFR SERPLBLD CREATININE-BSD FMLA CKD-EPI: 19 ML/MIN/1.73/M2
GLOBULIN SER-MCNC: 3.2 GM/DL (ref 2–3.9)
GLUCOSE SERPL-MCNC: 141 MG/DL (ref 70–115)
GLUCOSE UR QL STRIP: NEGATIVE
HCT VFR BLD AUTO: 29.3 % (ref 36–48)
HGB BLD-MCNC: 9.4 G/DL (ref 11.8–16)
HGB UR QL STRIP: ABNORMAL
IMM GRANULOCYTES # BLD AUTO: 0.01 X10(3)/MCL (ref 0–0.03)
IMM GRANULOCYTES NFR BLD AUTO: 0.2 % (ref 0–0.5)
KETONES UR QL STRIP: NEGATIVE
LEUKOCYTE ESTERASE UR QL STRIP: NEGATIVE
LYMPHOCYTES # BLD AUTO: 1.32 X10(3)/MCL (ref 1.16–3.74)
LYMPHOCYTES NFR BLD AUTO: 27.3 % (ref 20–55)
MAGNESIUM SERPL-MCNC: 1.8 MG/DL (ref 1.8–2.4)
MCH RBC QN AUTO: 30.5 PG (ref 27–34)
MCHC RBC AUTO-ENTMCNC: 32.1 G/DL (ref 31–37)
MCV RBC AUTO: 95.1 FL (ref 79–99)
MONOCYTES # BLD AUTO: 0.49 X10(3)/MCL (ref 0.24–0.36)
MONOCYTES NFR BLD AUTO: 10.1 % (ref 4.7–12.5)
NEUTROPHILS # BLD AUTO: 2.93 X10(3)/MCL (ref 1.56–6.13)
NEUTROPHILS NFR BLD AUTO: 60.8 % (ref 37–73)
NITRITE UR QL STRIP: NEGATIVE
NRBC BLD AUTO-RTO: 0 %
PH UR STRIP: 6 [PH]
PLATELET # BLD AUTO: 148 X10(3)/MCL (ref 140–371)
PMV BLD AUTO: 11 FL (ref 9.4–12.4)
POCT GLUCOSE: 164 MG/DL (ref 70–110)
POCT GLUCOSE: 99 MG/DL (ref 70–110)
POTASSIUM SERPL-SCNC: 4.2 MMOL/L (ref 3.5–5.1)
PROT SERPL-MCNC: 7 GM/DL (ref 6.3–8.2)
PROT UR QL STRIP: ABNORMAL
RBC # BLD AUTO: 3.08 X10(6)/MCL (ref 4–5.1)
RBC #/AREA URNS AUTO: NORMAL /HPF
SODIUM SERPL-SCNC: 137 MMOL/L (ref 136–145)
SP GR UR STRIP.AUTO: 1.01 (ref 1–1.03)
SQUAMOUS #/AREA URNS AUTO: NORMAL /HPF
TROPONIN I SERPL-MCNC: <0.012 NG/ML (ref 0–0.03)
UROBILINOGEN UR STRIP-ACNC: 0.2
WBC # SPEC AUTO: 4.83 X10(3)/MCL (ref 4–11.5)
WBC #/AREA URNS AUTO: NORMAL /HPF

## 2024-06-07 PROCEDURE — 80053 COMPREHEN METABOLIC PANEL: CPT | Performed by: FAMILY MEDICINE

## 2024-06-07 PROCEDURE — 93010 ELECTROCARDIOGRAM REPORT: CPT | Mod: ,,, | Performed by: INTERNAL MEDICINE

## 2024-06-07 PROCEDURE — 83735 ASSAY OF MAGNESIUM: CPT | Performed by: FAMILY MEDICINE

## 2024-06-07 PROCEDURE — 25000003 PHARM REV CODE 250: Performed by: SURGERY

## 2024-06-07 PROCEDURE — 81003 URINALYSIS AUTO W/O SCOPE: CPT | Performed by: FAMILY MEDICINE

## 2024-06-07 PROCEDURE — 99285 EMERGENCY DEPT VISIT HI MDM: CPT | Mod: 25

## 2024-06-07 PROCEDURE — 96360 HYDRATION IV INFUSION INIT: CPT

## 2024-06-07 PROCEDURE — 81015 MICROSCOPIC EXAM OF URINE: CPT | Performed by: FAMILY MEDICINE

## 2024-06-07 PROCEDURE — 96361 HYDRATE IV INFUSION ADD-ON: CPT

## 2024-06-07 PROCEDURE — 82962 GLUCOSE BLOOD TEST: CPT

## 2024-06-07 PROCEDURE — 84484 ASSAY OF TROPONIN QUANT: CPT | Performed by: FAMILY MEDICINE

## 2024-06-07 PROCEDURE — 85025 COMPLETE CBC W/AUTO DIFF WBC: CPT | Performed by: FAMILY MEDICINE

## 2024-06-07 PROCEDURE — 83880 ASSAY OF NATRIURETIC PEPTIDE: CPT | Performed by: FAMILY MEDICINE

## 2024-06-07 PROCEDURE — 93005 ELECTROCARDIOGRAM TRACING: CPT

## 2024-06-07 PROCEDURE — 11000001 HC ACUTE MED/SURG PRIVATE ROOM

## 2024-06-07 RX ORDER — ONDANSETRON HYDROCHLORIDE 2 MG/ML
4 INJECTION, SOLUTION INTRAVENOUS EVERY 8 HOURS PRN
Status: DISCONTINUED | OUTPATIENT
Start: 2024-06-07 | End: 2024-06-08 | Stop reason: HOSPADM

## 2024-06-07 RX ORDER — IBUPROFEN 200 MG
16 TABLET ORAL
Status: DISCONTINUED | OUTPATIENT
Start: 2024-06-07 | End: 2024-06-08 | Stop reason: HOSPADM

## 2024-06-07 RX ORDER — ACETAMINOPHEN 325 MG/1
650 TABLET ORAL EVERY 4 HOURS PRN
Status: DISCONTINUED | OUTPATIENT
Start: 2024-06-07 | End: 2024-06-08 | Stop reason: HOSPADM

## 2024-06-07 RX ORDER — AMLODIPINE BESYLATE 5 MG/1
10 TABLET ORAL DAILY
Status: DISCONTINUED | OUTPATIENT
Start: 2024-06-08 | End: 2024-06-08 | Stop reason: HOSPADM

## 2024-06-07 RX ORDER — GLUCAGON 1 MG
1 KIT INJECTION
Status: DISCONTINUED | OUTPATIENT
Start: 2024-06-07 | End: 2024-06-08 | Stop reason: HOSPADM

## 2024-06-07 RX ORDER — IBUPROFEN 200 MG
24 TABLET ORAL
Status: DISCONTINUED | OUTPATIENT
Start: 2024-06-07 | End: 2024-06-08 | Stop reason: HOSPADM

## 2024-06-07 RX ORDER — INSULIN ASPART 100 [IU]/ML
0-5 INJECTION, SOLUTION INTRAVENOUS; SUBCUTANEOUS
Status: DISCONTINUED | OUTPATIENT
Start: 2024-06-07 | End: 2024-06-08 | Stop reason: HOSPADM

## 2024-06-07 RX ORDER — TALC
6 POWDER (GRAM) TOPICAL NIGHTLY PRN
Status: DISCONTINUED | OUTPATIENT
Start: 2024-06-07 | End: 2024-06-08 | Stop reason: HOSPADM

## 2024-06-07 RX ORDER — ALPRAZOLAM 0.5 MG/1
0.5 TABLET ORAL EVERY 8 HOURS PRN
Status: DISCONTINUED | OUTPATIENT
Start: 2024-06-07 | End: 2024-06-08 | Stop reason: HOSPADM

## 2024-06-07 RX ORDER — NALOXONE HCL 0.4 MG/ML
0.02 VIAL (ML) INJECTION
Status: DISCONTINUED | OUTPATIENT
Start: 2024-06-07 | End: 2024-06-08 | Stop reason: HOSPADM

## 2024-06-07 RX ORDER — HEPARIN SODIUM 5000 [USP'U]/ML
5000 INJECTION, SOLUTION INTRAVENOUS; SUBCUTANEOUS EVERY 8 HOURS
Status: DISCONTINUED | OUTPATIENT
Start: 2024-06-08 | End: 2024-06-08 | Stop reason: HOSPADM

## 2024-06-07 RX ORDER — SODIUM CHLORIDE 9 MG/ML
INJECTION, SOLUTION INTRAVENOUS CONTINUOUS
Status: DISCONTINUED | OUTPATIENT
Start: 2024-06-07 | End: 2024-06-08 | Stop reason: HOSPADM

## 2024-06-07 RX ORDER — SODIUM CHLORIDE 0.9 % (FLUSH) 0.9 %
10 SYRINGE (ML) INJECTION EVERY 12 HOURS PRN
Status: DISCONTINUED | OUTPATIENT
Start: 2024-06-07 | End: 2024-06-08 | Stop reason: HOSPADM

## 2024-06-07 RX ADMIN — SODIUM CHLORIDE: 9 INJECTION, SOLUTION INTRAVENOUS at 11:06

## 2024-06-07 NOTE — ED PROVIDER NOTES
"Encounter Date: 6/7/2024       History     Chief Complaint   Patient presents with    Weakness    Dizziness     Reports she has been here 3 times for the same thing. Reports she has not gotten better and still feels "off, dehydrated, and not myself." Pt reports she thought her sugar was low but was 164 in triage.      Patient presents for evaluation of malaise.  Patient notes having malaise for the past several days and having some increased shortness of breath with exertion.  Patient notes having a fall about a week ago where she did hit her head and just has not felt herself since.  At present emergency room patient denies nausea vomiting paresthesias unilateral neurologic symptoms.  Patient denies any trouble breathing.  Patient does note having some occasional dizziness with standing and movement.  Patient denies having any aggravating or relieving features at present.      Review of patient's allergies indicates:   Allergen Reactions    Latex      Past Medical History:   Diagnosis Date    Diabetes mellitus     High cholesterol     History of right breast cancer     History of stomach cancer     Hypertension     Stroke      Past Surgical History:   Procedure Laterality Date    COLON SURGERY      COLONOSCOPY  10/05/2016    MASTECTOMY Right     TOTAL ABDOMINAL HYSTERECTOMY       Family History   Problem Relation Name Age of Onset    Breast cancer Sister      Uterine cancer Neg Hx      Ovarian cancer Neg Hx      Colon cancer Neg Hx       Social History     Tobacco Use    Smoking status: Former     Types: Cigarettes    Smokeless tobacco: Never   Substance Use Topics    Alcohol use: Never    Drug use: Never     Review of Systems   Constitutional:  Positive for activity change and fatigue.   HENT: Negative.     Eyes: Negative.    Respiratory: Negative.     Cardiovascular: Negative.    Gastrointestinal: Negative.    Endocrine: Negative.    Genitourinary: Negative.    Musculoskeletal: Negative.    Skin: Negative.  "   Allergic/Immunologic: Negative.    Neurological:  Positive for syncope.   Hematological: Negative.    Psychiatric/Behavioral: Negative.         Physical Exam     Initial Vitals [06/07/24 1538]   BP Pulse Resp Temp SpO2   (!) 147/72 90 18 98.3 °F (36.8 °C) 99 %      MAP       --         Physical Exam    Vitals reviewed.  Constitutional: She appears well-developed and well-nourished. She is not diaphoretic. No distress.   HENT:   Head: Normocephalic.   Mouth/Throat: No oropharyngeal exudate.   Eyes: EOM are normal. Pupils are equal, round, and reactive to light. Right eye exhibits no discharge. Left eye exhibits no discharge.   Neck: Neck supple. No thyromegaly present. No tracheal deviation present. No JVD present.   Normal range of motion.  Cardiovascular:  Normal rate, regular rhythm and normal heart sounds.     Exam reveals no gallop and no friction rub.       No murmur heard.  Pulmonary/Chest: Breath sounds normal. No stridor. No respiratory distress. She has no wheezes. She has no rhonchi. She has no rales.   Abdominal: Abdomen is soft. Bowel sounds are normal. She exhibits no distension and no mass. There is no abdominal tenderness. There is no rebound and no guarding.   Musculoskeletal:         General: No tenderness or edema. Normal range of motion.      Cervical back: Normal range of motion and neck supple.     Lymphadenopathy:     She has no cervical adenopathy.   Neurological: She is alert and oriented to person, place, and time. GCS score is 15. GCS eye subscore is 4. GCS verbal subscore is 5. GCS motor subscore is 6.   Skin: Skin is warm.   Psychiatric: She has a normal mood and affect.         ED Course   Procedures  Labs Reviewed   COMPREHENSIVE METABOLIC PANEL - Abnormal; Notable for the following components:       Result Value    CO2 19 (*)     Glucose 141 (*)     Blood Urea Nitrogen 41 (*)     Creatinine 2.55 (*)      (*)      (*)     All other components within normal limits    URINALYSIS, REFLEX TO URINE CULTURE - Abnormal; Notable for the following components:    Protein, UA Trace (*)     Blood, UA Small (*)     All other components within normal limits    Narrative:      URINE STABILITY IS 2 HOURS AT ROOM TEMP OR    SIX HOURS REFRIGERATED. PERFORMING TESTING ON    SPECIMENS GREATER THAN THIS AGE MAY AFFECT THE    FOLLOWING TESTS:    PH          SPECIFIC GRAVITY           BLOOD    CLARITY     BILIRUBIN               UROBILINOGEN   CBC WITH DIFFERENTIAL - Abnormal; Notable for the following components:    RBC 3.08 (*)     Hgb 9.4 (*)     Hct 29.3 (*)     Mono # 0.49 (*)     All other components within normal limits   POCT GLUCOSE - Abnormal; Notable for the following components:    POCT Glucose 164 (*)     All other components within normal limits   MAGNESIUM - Normal   TROPONIN I - Normal   NT-PRO NATRIURETIC PEPTIDE - Normal   URINALYSIS, MICROSCOPIC - Normal   CBC W/ AUTO DIFFERENTIAL    Narrative:     The following orders were created for panel order CBC auto differential.  Procedure                               Abnormality         Status                     ---------                               -----------         ------                     CBC with Differential[[id number]]       Abnormal            Final result                 Please view results for these tests on the individual orders.     EKG Readings: (Independently Interpreted)   Initial Reading: No STEMI. Ectopy: No Ectopy. Conduction: Normal. ST Segments: Normal ST Segments. T Waves: Normal. Axis: Normal.   SINUS FOCUS  NO ECTOPY  NO ISCHEMIC CHANGES   WNL  ARS 74 WNL  QTc 422 WNL       Imaging Results              CT Abdomen Pelvis  Without Contrast (Final result)  Result time 06/07/24 20:00:10      Final result by Shaheen Knight MD (06/07/24 20:00:10)                   Impression:      1. No acute abnormality of the abdomen and pelvis.  2. Constipation.  3. Ventral abdominal wall hernia containing nondilated  transverse colon.      Electronically signed by: Shaheen Knight MD  Date:    06/07/2024  Time:    20:00               Narrative:    EXAMINATION:  CT ABDOMEN PELVIS WITHOUT CONTRAST    CLINICAL HISTORY:  Metastatic disease evaluation;    TECHNIQUE:  Axial images of the abdomen and pelvis were obtained without IV contrast administration.  Coronal and sagittal reconstructions were provided.  Three dimensional and MIP images were obtained and evaluated.  Total DLP was 344.7 mGy-cm. Dose lowering technique and automated exposure control were utilized for this exam.    COMPARISON:  CT of the abdomen and pelvis 05/27/2024.    FINDINGS:  There is a calcified granuloma in the right lung base.  The left lung base is clear.  The heart is not enlarged.    The liver is homogeneous in attenuation.  The gallbladder is normal.  There are calcified granulomas in the spleen.  There is fatty atrophy of the pancreas.  The adrenal glands are normal.  The bilateral kidneys are normal.  There is no hydronephrosis or nephrolithiasis.    There are postsurgical changes in the stomach.  The small bowel is decompressed.  The appendix is normal.  There is a ventral hernia containing nondilated transverse colon.  There is a large amount of stool throughout the colon.  The uterus is surgically absent.  The urinary bladder is normal.  There is no pelvic or retroperitoneal lymphadenopathy.  The aorta is nonaneurysmal.  There is no lytic or blastic osseous lesion.                                       X-Ray Chest 1 View (Final result)  Result time 06/07/24 16:43:21      Final result by Jarret Duarte MD (06/07/24 16:43:21)                   Impression:      No acute abnormality.      Electronically signed by: Jarret Duarte  Date:    06/07/2024  Time:    16:43               Narrative:    EXAMINATION:  XR CHEST 1 VIEW    CLINICAL HISTORY:  Other malaise    TECHNIQUE:  Single frontal view of the chest was  performed.    COMPARISON:  06/27/2023    FINDINGS:  The exam is similar to that seen on the previous study of 06/27/2023 again showing a slightly borderline size heart with some ectasia involving the aorta.  There is no evidence of vascular congestion or localized, lobar or segmental infiltrate or significant pleural effusion or pneumothorax.  Postsurgical findings are again noted involving the right axillary region.                                       CT Head Without Contrast (Final result)  Result time 06/07/24 16:27:43      Final result by Shaheen Knight MD (06/07/24 16:27:43)                   Impression:      No acute intracranial abnormality.      Electronically signed by: Shaheen Knight MD  Date:    06/07/2024  Time:    16:27               Narrative:    EXAMINATION:  CT HEAD WITHOUT CONTRAST    CLINICAL HISTORY:  Malaise;    TECHNIQUE:  Axial images of the head were obtained without IV contrast administration.  Coronal and sagittal reconstructions were provided.  Three dimensional and MIP images were obtained and evaluated.  Total DLP was 1044.4 mGy-cm. Dose lowering technique and automated exposure control were utilized for this exam.    COMPARISON:  CT of the head 06/03/2024.    FINDINGS:  There is normal brain formation.  There is normal gray-white matter differentiation.  There is no hemorrhage, hydrocephalus, or midline shift.  There is no cytotoxic or vasogenic edema.  There is no intra or extra-axial fluid collection.  There is no herniation.    The calvarium is intact.  There is no fracture.  The bilateral orbits are normal.  The paranasal sinuses and mastoid air cells are normally developed and free of disease.                                       Medications   sodium chloride 0.9% bolus 1,000 mL 1,000 mL (1,000 mLs Intravenous Bolus from Bag 6/7/24 1813)     Medical Decision Making  Amount and/or Complexity of Data Reviewed  Labs: ordered.  Radiology: ordered.               ED Course as of 06/07/24  2121 ORTHOSTATIC TESTING:    LYIN/64  89  STANDIN/68  100  POSITIVE BY PULSE [WV]    DR MORALES ACCEPTED FOR ADMISSION   [WV]      ED Course User Index  [WV] Norris Reyna MD                             Clinical Impression:  Final diagnoses:  [R53.81] Malaise  [R42] Dizziness (Primary)  [N17.9, N18.9] Acute renal failure superimposed on chronic kidney disease, unspecified acute renal failure type, unspecified CKD stage  [E87.20] Metabolic acidosis  [R74.01] Transaminitis  [I95.1] Orthostatic hypotension  [D64.9] Normocytic anemia          ED Disposition Condition    Admit Norris Vaca MD  24

## 2024-06-08 VITALS
HEIGHT: 65 IN | BODY MASS INDEX: 27 KG/M2 | RESPIRATION RATE: 18 BRPM | DIASTOLIC BLOOD PRESSURE: 68 MMHG | SYSTOLIC BLOOD PRESSURE: 132 MMHG | HEART RATE: 84 BPM | TEMPERATURE: 98 F | OXYGEN SATURATION: 97 % | WEIGHT: 162.06 LBS

## 2024-06-08 PROBLEM — R74.01 TRANSAMINITIS: Status: ACTIVE | Noted: 2024-06-08

## 2024-06-08 LAB
ALBUMIN SERPL-MCNC: 3 G/DL (ref 3.4–5)
ALBUMIN/GLOB SERPL: 1 RATIO
ALP SERPL-CCNC: 85 UNIT/L (ref 50–144)
ALT SERPL-CCNC: 150 UNIT/L (ref 1–45)
ANION GAP SERPL CALC-SCNC: 8 MEQ/L (ref 2–13)
AST SERPL-CCNC: 303 UNIT/L (ref 14–36)
BASOPHILS # BLD AUTO: 0.03 X10(3)/MCL (ref 0.01–0.08)
BASOPHILS NFR BLD AUTO: 0.8 % (ref 0.1–1.2)
BILIRUB SERPL-MCNC: 0.3 MG/DL (ref 0–1)
BUN SERPL-MCNC: 36 MG/DL (ref 7–20)
CALCIUM SERPL-MCNC: 8.3 MG/DL (ref 8.4–10.2)
CHLORIDE SERPL-SCNC: 109 MMOL/L (ref 98–110)
CO2 SERPL-SCNC: 23 MMOL/L (ref 21–32)
CREAT SERPL-MCNC: 2.12 MG/DL (ref 0.66–1.25)
CREAT/UREA NIT SERPL: 17 (ref 12–20)
EOSINOPHIL # BLD AUTO: 0.07 X10(3)/MCL (ref 0.04–0.36)
EOSINOPHIL NFR BLD AUTO: 1.8 % (ref 0.7–7)
ERYTHROCYTE [DISTWIDTH] IN BLOOD BY AUTOMATED COUNT: 12.9 % (ref 11–14.5)
GFR SERPLBLD CREATININE-BSD FMLA CKD-EPI: 24 ML/MIN/1.73/M2
GLOBULIN SER-MCNC: 3 GM/DL (ref 2–3.9)
GLUCOSE SERPL-MCNC: 208 MG/DL (ref 70–115)
HCT VFR BLD AUTO: 27.1 % (ref 36–48)
HGB BLD-MCNC: 8.9 G/DL (ref 11.8–16)
IMM GRANULOCYTES # BLD AUTO: 0.01 X10(3)/MCL (ref 0–0.03)
IMM GRANULOCYTES NFR BLD AUTO: 0.3 % (ref 0–0.5)
LYMPHOCYTES # BLD AUTO: 1.24 X10(3)/MCL (ref 1.16–3.74)
LYMPHOCYTES NFR BLD AUTO: 31.3 % (ref 20–55)
MAGNESIUM SERPL-MCNC: 1.7 MG/DL (ref 1.8–2.4)
MCH RBC QN AUTO: 30.7 PG (ref 27–34)
MCHC RBC AUTO-ENTMCNC: 32.8 G/DL (ref 31–37)
MCV RBC AUTO: 93.4 FL (ref 79–99)
MONOCYTES # BLD AUTO: 0.37 X10(3)/MCL (ref 0.24–0.36)
MONOCYTES NFR BLD AUTO: 9.3 % (ref 4.7–12.5)
NEUTROPHILS # BLD AUTO: 2.24 X10(3)/MCL (ref 1.56–6.13)
NEUTROPHILS NFR BLD AUTO: 56.5 % (ref 37–73)
NRBC BLD AUTO-RTO: 0 %
PLATELET # BLD AUTO: 160 X10(3)/MCL (ref 140–371)
PMV BLD AUTO: 10.1 FL (ref 9.4–12.4)
POCT GLUCOSE: 176 MG/DL (ref 70–110)
POCT GLUCOSE: 184 MG/DL (ref 70–110)
POTASSIUM SERPL-SCNC: 3.6 MMOL/L (ref 3.5–5.1)
PROT SERPL-MCNC: 6 GM/DL (ref 6.3–8.2)
RBC # BLD AUTO: 2.9 X10(6)/MCL (ref 4–5.1)
SODIUM SERPL-SCNC: 140 MMOL/L (ref 136–145)
WBC # SPEC AUTO: 3.96 X10(3)/MCL (ref 4–11.5)

## 2024-06-08 PROCEDURE — 83735 ASSAY OF MAGNESIUM: CPT | Performed by: INTERNAL MEDICINE

## 2024-06-08 PROCEDURE — 36415 COLL VENOUS BLD VENIPUNCTURE: CPT | Performed by: INTERNAL MEDICINE

## 2024-06-08 PROCEDURE — 25000003 PHARM REV CODE 250: Performed by: INTERNAL MEDICINE

## 2024-06-08 PROCEDURE — 85025 COMPLETE CBC W/AUTO DIFF WBC: CPT | Performed by: INTERNAL MEDICINE

## 2024-06-08 PROCEDURE — 80053 COMPREHEN METABOLIC PANEL: CPT | Performed by: INTERNAL MEDICINE

## 2024-06-08 PROCEDURE — 63600175 PHARM REV CODE 636 W HCPCS: Performed by: INTERNAL MEDICINE

## 2024-06-08 RX ORDER — PANTOPRAZOLE SODIUM 40 MG/1
40 TABLET, DELAYED RELEASE ORAL DAILY
Status: DISCONTINUED | OUTPATIENT
Start: 2024-06-08 | End: 2024-06-08 | Stop reason: HOSPADM

## 2024-06-08 RX ORDER — ATORVASTATIN CALCIUM 40 MG/1
40 TABLET, FILM COATED ORAL NIGHTLY
Status: DISCONTINUED | OUTPATIENT
Start: 2024-06-08 | End: 2024-06-08 | Stop reason: HOSPADM

## 2024-06-08 RX ORDER — HYDROCODONE BITARTRATE AND ACETAMINOPHEN 10; 325 MG/1; MG/1
1 TABLET ORAL EVERY 6 HOURS PRN
Status: DISCONTINUED | OUTPATIENT
Start: 2024-06-08 | End: 2024-06-08 | Stop reason: HOSPADM

## 2024-06-08 RX ADMIN — HEPARIN SODIUM 5000 UNITS: 5000 INJECTION, SOLUTION INTRAVENOUS; SUBCUTANEOUS at 06:06

## 2024-06-08 RX ADMIN — AMLODIPINE BESYLATE 10 MG: 5 TABLET ORAL at 08:06

## 2024-06-08 NOTE — HOSPITAL COURSE
06/08/2024 pt admitted with GABRIELA on CKD stage 3.  Pt reports 15-20 lb weight loss and some epigastric abdominal pain intermittently.  Reveiwed her old chart and she has had some mildly elevated Lfts over the last 1-2 years.    CT done on admit showed a ventral hernia and constipation.    Given lfts and some intermittent abdominal pain concern for cholelithiasis/cholecystitis.  Pt tolerated regular diet 100% this am and her labs are imrpoved,  She is requesting to go home today.    We will schedule outpt abdominal US and set her up with DR. Morocho and f/u with PCP in the next 1 week.  She has improved and back to her baseline and ready for d/c home

## 2024-06-08 NOTE — ED NOTES
new connect request was successfully created for:  AMY JEFFERSON  : 1945  MRN: [id number]  ConnectID: [id number]  REASON:  Admit: non-ICU  ACUITY:  30 Minutes  SUBMITTED:  2024 21:11 CDT

## 2024-06-08 NOTE — H&P
Active Problem List:  -Acute kidney injury on chronic kidney disease stage IV  -Transaminitis  -Generalized muscle weakness  -Hypertension/hyperlipidemia  -Diabetes mellitus type 2      Medical Decision Making 06/07/2024:  -  Patient is being admitted to the hospital for issues of acute kidney injury on chronic kidney disease stage IV alongside issues with worsening generalized muscle weakness.  It is unclear what is causing patient's worsening condition.  Patient has had a progressive elevation in her LFTs over the past 2 years and they have almost tripled in the past month.  CT of the abdomen pelvis without IV contrast did not show much.  No elevation in T. bili.  Patient does not smoke cigarettes and is not currently drinking alcohol.  Patient does not have a history of hepatitis, could not find any laboratory studies regarding viral hepatitis so we ordered acute viral panel.  Patient's last TSH 1 month ago was within appropriate limits no need to be ordered.    -IV fluid resuscitation, monitor urine output and renal function    Diet: Diabetic  DVT Prophy:   heparin 5000 subcutaneous every 8 hours, SCD  Disposition: Patient is likely to be hospitalized for greater than 48 hours but less than 96 hours    This encounter was completed via telemedicine (audio/video) with nursing at bedside to assist with clinical exam. SOC Audio/Visual Equipment is using HIPAA compliant web platform.    Participants on call: Bedside RN, patient, physician on-call    Patient Location: [place], [place]  Provider Location: [place]  Physician on call: Jp Vang MD  Seen in emergency room awaiting bed placement: Yes  Status of patient: Inpatient    Patient aware of remote access and use of Telemedicine and agrees to continue with care.       Jp Vang MD  Internal Medicine Hospitalist  Date of service: 06/07/2024    ===========================================  Chief complaint: I feel weak  Allergies: Latex  CODE STATUS:  Full code    History:  Patient is a 78-year-old woman with history of diabetes mellitus type 2, hypertension/hyperlipidemia, CVA who had arrived at the emergency room with complaints of weakness and dizziness.    According to the patient she has been to the emergency room multiple times for this issue and has not had resolution in her symptoms.  Patient stated that she had generalized malaise, not feeling well, constantly tired admits to having some shortness of breath on exertion and weight loss but of an unknown amount.  Notes that her urine is darker.  Outside of that patient really did not provide much history.  Patient denied any headaches, changes in vision, runny nose, sinus congestion or cough.  Denies any constipation or diarrhea.  Denies any chest pain or abdominal pain.  No fevers or chills.    Initial vitals in the emergency room 98.3 °F, 90 bpm, 18 respirations a minute, 147/72 mmHg, 99% on room air.  Patient was 79.8 kg.    White blood cell count 4.8, hemoglobin 9.4, MCV of 95.1, platelet count of 148.  Neutrophil predominance of 60.8%.    Sodium of 137, potassium 4.2, chloride 107, carbon dioxide 19, BUN/creatinine 41/2.55 with a glucose of 141.  AST//164.  Alkaline phosphatase of 102.  T. bili of 0.5.  Troponin 1 is less than 0.012.  proBNP of 103.  Urinary analysis does not show evidence of a urinary tract infection.    CT of the abdomen and pelvis without IV contrast shows no acute abnormality of the abdomen and pelvis but does show evidence of constipation.  It showed a ventral abdominal wall hernia containing nondilated transverse colon.  CT of the head without contrast shows no acute intracranial abnormality.  X-ray of the chest shows no acute cardiopulmonary abnormality.    Patient was admitted to the hospital for issues of acute kidney injury on chronic kidney disease stage IV  ===========================================  Physical Exam:   (Clinical exam was done via telemedicine with  "nursing at bedside to assist with clinical exam, some portions of clinical exam from emergency room provider/nursing was used for reference)    /64 (BP Location: Right arm, Patient Position: Lying)   Pulse 87   Temp 98.7 °F (37.1 °C)   Resp 18   Ht 5' 5" (1.651 m)   Wt 73.5 kg (162 lb 0.6 oz)   SpO2 97%   Breastfeeding No   BMI 26.96 kg/m²     General: NAD, pleasant  HEENT: NCAT, EOMI, Moist Mucous Membranes  CV: S1S2 w/ RRR, (-) MRC, peripheral pulses intact and symmetrical  Resp: CTA w/o wrr symmetrical chest rise  GI: Snt, nd w/ bs  Musculoskeletal: MAL, (-) pedal edema  Skin: No obvious rash or lesion, normal skin color, appropriate skin turgor, dry  Neuro: No acute/new focal/gross neurological deficits appreciated, no facial asymmetry or weakness noted during interview/exam  Psych: Alert and oriented x3, appropriate mood and affect    Labs/imaging/medications/vitals/relevant electronic medical records have personally been reviewed by me    Patient screened for food insecurity, housing instability, transportation needs, utility difficulties, and  interpersonal safety.    Current Facility-Administered Medications on File Prior to Encounter   Medication Dose Route Frequency Provider Last Rate Last Admin    heparin, porcine (PF) 100 unit/mL injection flush 500 Units  500 Units Intravenous PRN Sylvain Ackerman MD        heparin, porcine (PF) 100 unit/mL injection flush 500 Units  500 Units Intravenous PRN Sylvain Ackerman MD        sodium chloride 0.9% 250 mL flush bag   Intravenous Sylvain Broderick MD        sodium chloride 0.9% 250 mL flush bag   Intravenous PRN Sylvain Ackerman MD        sodium chloride 0.9% 250 mL flush bag   Intravenous PRN Sylvain Ackerman MD        sodium chloride 0.9% 250 mL flush bag   Intravenous Sylvain Broderick MD        sodium chloride 0.9% 250 mL flush bag   Intravenous PRN Sylvain Ackerman MD        sodium chloride 0.9% flush 10 mL  10 mL Intravenous PRN " Sylvain Ackerman MD        sodium chloride 0.9% flush 10 mL  10 mL Intravenous PRN Sylvain Ackerman MD   10 mL at 06/22/23 1028    sodium chloride 0.9% flush 10 mL  10 mL Intravenous PRN Sylvain Ackerman MD   10 mL at 06/29/23 0951    sodium chloride 0.9% flush 10 mL  10 mL Intravenous PRN Sylvain Ackerman MD   10 mL at 07/06/23 1007    sodium chloride 0.9% flush 10 mL  10 mL Intravenous PRN Sylvain Ackerman MD         Current Outpatient Medications on File Prior to Encounter   Medication Sig Dispense Refill    ALPRAZolam (XANAX) 0.5 MG tablet Take 0.5 mg by mouth every 8 (eight) hours as needed.      amLODIPine (NORVASC) 10 MG tablet Take 10 mg by mouth.      conjugated estrogens (PREMARIN) vaginal cream Place 0.5 g vaginally twice a week. Insert 0.5 g vaginally at night for two weeks then 2-3 times a week thereafter 30 g 1    esomeprazole (NEXIUM) 40 MG capsule Take 40 mg by mouth 2 (two) times daily.      glipiZIDE (GLUCOTROL) 10 MG TR24 Take 10 mg by mouth 2 (two) times daily.      solifenacin (VESICARE) 10 MG tablet Take 1 tablet (10 mg total) by mouth once daily. 30 tablet 11    hydroCHLOROthiazide (HYDRODIURIL) 25 MG tablet Take 25 mg by mouth.      HYDROcodone-acetaminophen (NORCO)  mg per tablet TAKE 1 TABLET 3 TIMES DAILY AS NEEDED FOR PAIN --MAY CAUSE DROWSINESS--      metFORMIN (GLUCOPHAGE) 1000 MG tablet Take 1,000 mg by mouth 2 (two) times daily.      pantoprazole (PROTONIX) 40 MG tablet Take 40 mg by mouth 2 (two) times daily.      pioglitazone (ACTOS) 45 MG tablet Take 45 mg by mouth.      rosuvastatin (CRESTOR) 40 MG Tab Take 40 mg by mouth.      sucralfate (CARAFATE) 1 gram tablet Take 1 g by mouth.      triamcinolone acetonide 0.1% (KENALOG) 0.1 % cream APPLY TO AFFECTED AREA TWO TIMES A DAY      TRUE METRIX GLUCOSE TEST STRIP Strp USE 1 STRIP TO TEST 3 TIMES A DAY AS DIRECTED       Past Medical History:   Diagnosis Date    Diabetes mellitus     High cholesterol     History of right  breast cancer     History of stomach cancer     Hypertension     Stroke      Past Surgical History:   Procedure Laterality Date    COLON SURGERY      COLONOSCOPY  10/05/2016    MASTECTOMY Right     TOTAL ABDOMINAL HYSTERECTOMY       Social History     Socioeconomic History    Marital status:    Tobacco Use    Smoking status: Former     Types: Cigarettes    Smokeless tobacco: Never   Substance and Sexual Activity    Alcohol use: Never    Drug use: Never    Sexual activity: Not Currently     Family History   Problem Relation Name Age of Onset    Breast cancer Sister      Uterine cancer Neg Hx      Ovarian cancer Neg Hx      Colon cancer Neg Hx

## 2024-06-08 NOTE — HPI
Active Problem List:  -Acute kidney injury on chronic kidney disease stage IV  -Transaminitis  -Generalized muscle weakness  -Hypertension/hyperlipidemia  -Diabetes mellitus type 2        Medical Decision Making 06/07/2024:  -  Patient is being admitted to the hospital for issues of acute kidney injury on chronic kidney disease stage IV alongside issues with worsening generalized muscle weakness.  It is unclear what is causing patient's worsening condition.  Patient has had a progressive elevation in her LFTs over the past 2 years and they have almost tripled in the past month.  CT of the abdomen pelvis without IV contrast did not show much.  No elevation in T. bili.  Patient does not smoke cigarettes and is not currently drinking alcohol.  Patient does not have a history of hepatitis, could not find any laboratory studies regarding viral hepatitis so we ordered acute viral panel.  Patient's last TSH 1 month ago was within appropriate limits no need to be ordered.     -IV fluid resuscitation, monitor urine output and renal function            Chief complaint: I feel weak  Allergies: Latex  CODE STATUS: Full code     History:  Patient is a 78-year-old woman with history of diabetes mellitus type 2, hypertension/hyperlipidemia, CVA who had arrived at the emergency room with complaints of weakness and dizziness.     According to the patient she has been to the emergency room multiple times for this issue and has not had resolution in her symptoms.  Patient stated that she had generalized malaise, not feeling well, constantly tired admits to having some shortness of breath on exertion and weight loss but of an unknown amount.  Notes that her urine is darker.  Outside of that patient really did not provide much history.  Patient denied any headaches, changes in vision, runny nose, sinus congestion or cough.  Denies any constipation or diarrhea.  Denies any chest pain or abdominal pain.  No fevers or chills.      Initial vitals in the emergency room 98.3 °F, 90 bpm, 18 respirations a minute, 147/72 mmHg, 99% on room air.  Patient was 79.8 kg.     White blood cell count 4.8, hemoglobin 9.4, MCV of 95.1, platelet count of 148.  Neutrophil predominance of 60.8%.     Sodium of 137, potassium 4.2, chloride 107, carbon dioxide 19, BUN/creatinine 41/2.55 with a glucose of 141.  AST//164.  Alkaline phosphatase of 102.  T. bili of 0.5.  Troponin 1 is less than 0.012.  proBNP of 103.  Urinary analysis does not show evidence of a urinary tract infection.     CT of the abdomen and pelvis without IV contrast shows no acute abnormality of the abdomen and pelvis but does show evidence of constipation.  It showed a ventral abdominal wall hernia containing nondilated transverse colon.  CT of the head without contrast shows no acute intracranial abnormality.  X-ray of the chest shows no acute cardiopulmonary abnormality.

## 2024-06-08 NOTE — PLAN OF CARE
Problem: Adult Inpatient Plan of Care  Goal: Plan of Care Review  Outcome: Progressing  Goal: Patient-Specific Goal (Individualized)  Outcome: Progressing  Goal: Absence of Hospital-Acquired Illness or Injury  Outcome: Progressing  Goal: Optimal Comfort and Wellbeing  Outcome: Progressing  Goal: Readiness for Transition of Care  Outcome: Progressing     Problem: Fall Injury Risk  Goal: Absence of Fall and Fall-Related Injury  Outcome: Progressing     Problem: Fatigue  Goal: Improved Activity Tolerance  Outcome: Progressing     Problem: Comorbidity Management  Goal: Blood Pressure in Desired Range  Outcome: Progressing     Problem: Acute Kidney Injury/Impairment  Goal: Fluid and Electrolyte Balance  Outcome: Progressing  Goal: Improved Oral Intake  Outcome: Progressing  Goal: Effective Renal Function  Outcome: Progressing

## 2024-06-08 NOTE — DISCHARGE SUMMARY
[facility]/Surg  [facility] Medicine  Discharge Summary      Patient Name: Britta Rivas  MRN: [id number]  ANIHTA: [id number]  Patient Class: IP- Inpatient  Admission Date: 6/7/2024  Hospital Length of Stay: 1 days  Discharge Date and Time:  06/08/2024 10:15 AM  Attending Physician: Michael Cisse MD   Discharging Provider: Kelly Marques MD  Primary Care Provider: Sylvain Ackerman MD    Primary Care Team: Networked reference to record PCT     HPI:           Active Problem List:  -Acute kidney injury on chronic kidney disease stage IV  -Transaminitis  -Generalized muscle weakness  -Hypertension/hyperlipidemia  -Diabetes mellitus type 2        Medical Decision Making 06/07/2024:  -  Patient is being admitted to the hospital for issues of acute kidney injury on chronic kidney disease stage IV alongside issues with worsening generalized muscle weakness.  It is unclear what is causing patient's worsening condition.  Patient has had a progressive elevation in her LFTs over the past 2 years and they have almost tripled in the past month.  CT of the abdomen pelvis without IV contrast did not show much.  No elevation in T. bili.  Patient does not smoke cigarettes and is not currently drinking alcohol.  Patient does not have a history of hepatitis, could not find any laboratory studies regarding viral hepatitis so we ordered acute viral panel.  Patient's last TSH 1 month ago was within appropriate limits no need to be ordered.     -IV fluid resuscitation, monitor urine output and renal function            Chief complaint: I feel weak  Allergies: Latex  CODE STATUS: Full code     History:  Patient is a 78-year-old woman with history of diabetes mellitus type 2, hypertension/hyperlipidemia, CVA who had arrived at the emergency room with complaints of weakness and dizziness.     According to the patient she has been to the emergency room multiple times for this issue and has not had resolution in her symptoms.   Patient stated that she had generalized malaise, not feeling well, constantly tired admits to having some shortness of breath on exertion and weight loss but of an unknown amount.  Notes that her urine is darker.  Outside of that patient really did not provide much history.  Patient denied any headaches, changes in vision, runny nose, sinus congestion or cough.  Denies any constipation or diarrhea.  Denies any chest pain or abdominal pain.  No fevers or chills.     Initial vitals in the emergency room 98.3 °F, 90 bpm, 18 respirations a minute, 147/72 mmHg, 99% on room air.  Patient was 79.8 kg.     White blood cell count 4.8, hemoglobin 9.4, MCV of 95.1, platelet count of 148.  Neutrophil predominance of 60.8%.     Sodium of 137, potassium 4.2, chloride 107, carbon dioxide 19, BUN/creatinine 41/2.55 with a glucose of 141.  AST//164.  Alkaline phosphatase of 102.  T. bili of 0.5.  Troponin 1 is less than 0.012.  proBNP of 103.  Urinary analysis does not show evidence of a urinary tract infection.     CT of the abdomen and pelvis without IV contrast shows no acute abnormality of the abdomen and pelvis but does show evidence of constipation.  It showed a ventral abdominal wall hernia containing nondilated transverse colon.  CT of the head without contrast shows no acute intracranial abnormality.  X-ray of the chest shows no acute cardiopulmonary abnormality.             * No surgery found *      Hospital Course:   06/08/2024 pt admitted with GABRIELA on CKD stage 3.  Pt reports 15-20 lb weight loss and some epigastric abdominal pain intermittently.  Reveiwed her old chart and she has had some mildly elevated Lfts over the last 1-2 years.    CT done on admit showed a ventral hernia and constipation.    Given lfts and some intermittent abdominal pain concern for cholelithiasis/cholecystitis.  Pt tolerated regular diet 100% this am and her labs are imrpoved,  She is requesting to go home today.    We will schedule outpt  abdominal US and set her up with DR. Morocho and f/u with PCP in the next 1 week.  She has improved and back to her baseline and ready for d/c home     Goals of Care Treatment Preferences:  Code Status: Full Code      Consults:   Consults (From admission, onward)          Status Ordering Provider     Inpatient consult to Hospitalist  Once        Provider:  (Not yet assigned)    Acknowledged HAJA MORALES            No new Assessment & Plan notes have been filed under this hospital service since the last note was generated.  Service: Hospital Medicine    Final Active Diagnoses:    Diagnosis Date Noted POA    PRINCIPAL PROBLEM:  Acute renal failure superimposed on stage 3b chronic kidney disease [N17.9, N18.32] 06/07/2024 Yes    Weakness [R53.1] 05/22/2024 Yes    Dehydration [E86.0] 05/22/2024 Yes      Problems Resolved During this Admission:       Discharged Condition: good    Disposition:     Follow Up:   Follow-up Information       Sylvain Ackerman MD Follow up in 1 week(s).    Specialties: Internal Medicine, General Practice  Contact information:  [facility] [place]  [facility] [address]  [place] [address]  [phone]               Osman Morocho MD Follow up.    Specialties: General Surgery, Cardiology  Contact information:  [facility] [place]  [facility] [address]  [place] [address]  [phone]                           Patient Instructions:   No discharge procedures on file.    Significant Diagnostic Studies: Labs: CMP   Recent Labs   Lab 06/07/24  1648 06/08/24 0617    140   K 4.2 3.6    109   CO2 19* 23   BUN 41* 36*   CREATININE 2.55* 2.12*   CALCIUM 8.7 8.3*   ALBUMIN 3.8 3.0*   BILITOT 0.5 0.3   ALKPHOS 102 85   * 303*   * 150*    and CBC   Recent Labs   Lab 06/07/24  1648 06/08/24  0618   WBC 4.83 3.96*   HGB 9.4* 8.9*   HCT 29.3* 27.1*    160       Pending Diagnostic Studies:       Procedure Component Value Units Date/Time    Hepatitis Panel, Acute [[id number]] Collected: 06/08/24 0617     Order Status: Sent Lab Status: In process Updated: 06/08/24 0622    Specimen: Blood     US Abdomen Complete [[id number]]     Order Status: Sent Lab Status: No result            Medications:  Reconciled Home Medications:      Medication List        ASK your doctor about these medications      ALPRAZolam 0.5 MG tablet  Commonly known as: XANAX  Take 0.5 mg by mouth every 8 (eight) hours as needed.     amLODIPine 10 MG tablet  Commonly known as: NORVASC  Take 10 mg by mouth.     conjugated estrogens vaginal cream  Commonly known as: PREMARIN  Place 0.5 g vaginally twice a week. Insert 0.5 g vaginally at night for two weeks then 2-3 times a week thereafter     esomeprazole 40 MG capsule  Commonly known as: NEXIUM  Take 40 mg by mouth 2 (two) times daily.     glipiZIDE 10 MG Tr24  Commonly known as: GLUCOTROL  Take 10 mg by mouth 2 (two) times daily.     hydroCHLOROthiazide 25 MG tablet  Commonly known as: HYDRODIURIL  Take 25 mg by mouth.     HYDROcodone-acetaminophen  mg per tablet  Commonly known as: NORCO  TAKE 1 TABLET 3 TIMES DAILY AS NEEDED FOR PAIN --MAY CAUSE DROWSINESS--     metFORMIN 1000 MG tablet  Commonly known as: GLUCOPHAGE  Take 1,000 mg by mouth 2 (two) times daily.     pioglitazone 45 MG tablet  Commonly known as: ACTOS  Take 45 mg by mouth.     rosuvastatin 40 MG Tab  Commonly known as: CRESTOR  Take 40 mg by mouth.     solifenacin 10 MG tablet  Commonly known as: VESICARE  Take 1 tablet (10 mg total) by mouth once daily.     TRUE METRIX GLUCOSE TEST STRIP Strp  Generic drug: blood sugar diagnostic  USE 1 STRIP TO TEST 3 TIMES A DAY AS DIRECTED              Indwelling Lines/Drains at time of discharge:   Lines/Drains/Airways       None                   Time spent on the discharge of patient: 36 minutes     Physical Exam  Constitutional:       General: She is not in acute distress.     Appearance: Normal appearance. She is normal weight. She is not ill-appearing.   Cardiovascular:      Rate and  Rhythm: Normal rate and regular rhythm.      Pulses: Normal pulses.      Heart sounds: Normal heart sounds.   Pulmonary:      Effort: Pulmonary effort is normal.      Breath sounds: Normal breath sounds.   Abdominal:      General: Abdomen is flat.      Palpations: Abdomen is soft.   Skin:     General: Skin is warm and dry.      Findings: No erythema, lesion or rash.   Neurological:      Mental Status: She is alert.   Psychiatric:         Behavior: Behavior normal.      Comments: I had a face to face encounter with this patient prior to d/c        Patient Screened for food insecurity, housing instability, transportation needs, utility difficulties, and interpersonal safety.  No needs identified      Kelly Marques MD  Department of Hospital Medicine  [facility]

## 2024-06-08 NOTE — NURSING
Nurses Note -- 4 Eyes      6/7/2024   11:35 PM      Skin assessed during: Admit      [x] No Altered Skin Integrity Present    []Prevention Measures Documented      [] Yes- Altered Skin Integrity Present or Discovered   [] LDA Added if Not in Epic (Describe Wound)   [] New Altered Skin Integrity was Present on Admit and Documented in LDA   [] Wound Image Taken    Wound Care Consulted? No    Attending Nurse:  Shiasta Petersen RN/Staff Member:  Eva

## 2024-06-08 NOTE — ED NOTES
CONSULT WITH DR NATA JULIAN, SON NOTIFIED OF ADMIT, REQUESTED MEDS OR MED LIST BE BROUGHT TO HOSPITAL.

## 2024-06-08 NOTE — PLAN OF CARE
Problem: Adult Inpatient Plan of Care  Goal: Plan of Care Review  Outcome: Progressing  Goal: Patient-Specific Goal (Individualized)  Outcome: Progressing  Goal: Absence of Hospital-Acquired Illness or Injury  Outcome: Progressing  Goal: Optimal Comfort and Wellbeing  Outcome: Progressing  Goal: Readiness for Transition of Care  Outcome: Progressing     Problem: Fall Injury Risk  Goal: Absence of Fall and Fall-Related Injury  Outcome: Progressing     Problem: Fatigue  Goal: Improved Activity Tolerance  Outcome: Progressing     Problem: Comorbidity Management  Goal: Blood Pressure in Desired Range  Outcome: Progressing     Problem: Acute Kidney Injury/Impairment  Goal: Fluid and Electrolyte Balance  Outcome: Progressing  Goal: Improved Oral Intake  Outcome: Progressing  Goal: Effective Renal Function  Outcome: Progressing     Problem: Skin Injury Risk Increased  Goal: Skin Health and Integrity  Outcome: Progressing

## 2024-06-08 NOTE — PLAN OF CARE
Problem: Adult Inpatient Plan of Care  Goal: Plan of Care Review  Outcome: Met  Goal: Patient-Specific Goal (Individualized)  Outcome: Met  Goal: Absence of Hospital-Acquired Illness or Injury  Outcome: Met  Goal: Optimal Comfort and Wellbeing  Outcome: Met  Goal: Readiness for Transition of Care  Outcome: Met     Problem: Fall Injury Risk  Goal: Absence of Fall and Fall-Related Injury  Outcome: Met     Problem: Fatigue  Goal: Improved Activity Tolerance  Outcome: Met     Problem: Comorbidity Management  Goal: Blood Pressure in Desired Range  Outcome: Met     Problem: Acute Kidney Injury/Impairment  Goal: Fluid and Electrolyte Balance  Outcome: Met  Goal: Improved Oral Intake  Outcome: Met  Goal: Effective Renal Function  Outcome: Met     Problem: Skin Injury Risk Increased  Goal: Skin Health and Integrity  Outcome: Met

## 2024-06-10 LAB
OHS QRS DURATION: 74 MS
OHS QTC CALCULATION: 422 MS

## 2024-06-11 ENCOUNTER — HOSPITAL ENCOUNTER (OUTPATIENT)
Dept: RADIOLOGY | Facility: HOSPITAL | Age: 79
Discharge: HOME | End: 2024-06-11
Attending: FAMILY MEDICINE
Payer: MEDICARE

## 2024-06-11 DIAGNOSIS — R42 DIZZINESS: ICD-10-CM

## 2024-06-11 PROCEDURE — 76705 ECHO EXAM OF ABDOMEN: CPT | Mod: TC

## 2024-06-18 NOTE — CLINICAL REVIEW
78-year-old female admitted with GABRIELA on CKD stage 4.  Comorbidities of hypertension, hyperlipidemia, type 2 diabetes mellitus.  Started on IV hydration.  Imaging was unremarkable.  There was a concern for cholelithiasis and cholecystitis, however, given her minimal symptoms, she was scheduled for outpatient ultrasound.  Her GABRIELA resolved on hospital day 2. With supportive care.  There was no evidence of hemodynamic instability, hypoxia, hypercapnia, food/medication intolerance.  It was not expected that her hospital level of care will require 2 midnights.  Appropriate for a lower level of care       Jason Brown MD  Utilization Management  Physician Advisor  [facility]  06/18/2024

## 2024-07-11 ENCOUNTER — LAB REQUISITION (OUTPATIENT)
Dept: LAB | Facility: HOSPITAL | Age: 79
End: 2024-07-11
Payer: MEDICARE

## 2024-07-11 DIAGNOSIS — E86.0 DEHYDRATION: ICD-10-CM

## 2024-07-11 DIAGNOSIS — E87.8 OTHER DISORDERS OF ELECTROLYTE AND FLUID BALANCE, NOT ELSEWHERE CLASSIFIED: ICD-10-CM

## 2024-07-11 LAB
ANION GAP SERPL CALC-SCNC: 14 MEQ/L (ref 2–13)
BUN SERPL-MCNC: 40 MG/DL (ref 7–20)
CALCIUM SERPL-MCNC: 9.4 MG/DL (ref 8.4–10.2)
CHLORIDE SERPL-SCNC: 109 MMOL/L (ref 98–110)
CO2 SERPL-SCNC: 17 MMOL/L (ref 21–32)
CREAT SERPL-MCNC: 2.48 MG/DL (ref 0.66–1.25)
CREAT/UREA NIT SERPL: 16 (ref 12–20)
GFR SERPLBLD CREATININE-BSD FMLA CKD-EPI: 20 ML/MIN/1.73/M2
GLUCOSE SERPL-MCNC: 132 MG/DL (ref 70–115)
POTASSIUM SERPL-SCNC: 5.4 MMOL/L (ref 3.5–5.1)
SODIUM SERPL-SCNC: 140 MMOL/L (ref 136–145)

## 2024-07-11 PROCEDURE — 80048 BASIC METABOLIC PNL TOTAL CA: CPT | Performed by: INTERNAL MEDICINE

## 2024-07-18 ENCOUNTER — HOSPITAL ENCOUNTER (EMERGENCY)
Facility: HOSPITAL | Age: 79
Discharge: HOME | End: 2024-07-18
Attending: FAMILY MEDICINE
Payer: MEDICARE

## 2024-07-18 ENCOUNTER — HOSPITAL ENCOUNTER (OUTPATIENT)
Dept: RADIOLOGY | Facility: HOSPITAL | Age: 79
Discharge: HOME | End: 2024-07-18
Attending: INTERNAL MEDICINE
Payer: MEDICARE

## 2024-07-18 VITALS
SYSTOLIC BLOOD PRESSURE: 174 MMHG | HEIGHT: 65 IN | WEIGHT: 150 LBS | BODY MASS INDEX: 24.99 KG/M2 | DIASTOLIC BLOOD PRESSURE: 81 MMHG | TEMPERATURE: 98 F | RESPIRATION RATE: 18 BRPM | HEART RATE: 81 BPM | OXYGEN SATURATION: 98 %

## 2024-07-18 DIAGNOSIS — R07.9 CHEST PAIN: ICD-10-CM

## 2024-07-18 DIAGNOSIS — N18.4 CHRONIC KIDNEY DISEASE, STAGE IV (SEVERE): ICD-10-CM

## 2024-07-18 DIAGNOSIS — E83.42 HYPOMAGNESEMIA: ICD-10-CM

## 2024-07-18 DIAGNOSIS — R10.13 EPIGASTRIC PAIN: ICD-10-CM

## 2024-07-18 DIAGNOSIS — N18.9 ANEMIA DUE TO CHRONIC KIDNEY DISEASE, UNSPECIFIED CKD STAGE: ICD-10-CM

## 2024-07-18 DIAGNOSIS — D63.1 ANEMIA DUE TO CHRONIC KIDNEY DISEASE, UNSPECIFIED CKD STAGE: ICD-10-CM

## 2024-07-18 DIAGNOSIS — K43.9 VENTRAL HERNIA WITHOUT OBSTRUCTION OR GANGRENE: Primary | ICD-10-CM

## 2024-07-18 LAB
ALBUMIN SERPL-MCNC: 4.2 G/DL (ref 3.4–5)
ALBUMIN/GLOB SERPL: 1.4 RATIO
ALP SERPL-CCNC: 91 UNIT/L (ref 50–144)
ALT SERPL-CCNC: 144 UNIT/L (ref 1–45)
ANION GAP SERPL CALC-SCNC: 14 MEQ/L (ref 2–13)
AST SERPL-CCNC: 312 UNIT/L (ref 14–36)
BASOPHILS # BLD AUTO: 0.03 X10(3)/MCL (ref 0.01–0.08)
BASOPHILS NFR BLD AUTO: 0.6 % (ref 0.1–1.2)
BILIRUB SERPL-MCNC: 0.4 MG/DL (ref 0–1)
BUN SERPL-MCNC: 43 MG/DL (ref 7–20)
CALCIUM SERPL-MCNC: 9 MG/DL (ref 8.4–10.2)
CHLORIDE SERPL-SCNC: 103 MMOL/L (ref 98–110)
CO2 SERPL-SCNC: 21 MMOL/L (ref 21–32)
CREAT SERPL-MCNC: 2.93 MG/DL (ref 0.66–1.25)
CREAT/UREA NIT SERPL: 15 (ref 12–20)
EOSINOPHIL # BLD AUTO: 0.05 X10(3)/MCL (ref 0.04–0.36)
EOSINOPHIL NFR BLD AUTO: 1 % (ref 0.7–7)
ERYTHROCYTE [DISTWIDTH] IN BLOOD BY AUTOMATED COUNT: 14.3 % (ref 11–14.5)
GFR SERPLBLD CREATININE-BSD FMLA CKD-EPI: 16 ML/MIN/1.73/M2
GLOBULIN SER-MCNC: 3 GM/DL (ref 2–3.9)
GLUCOSE SERPL-MCNC: 167 MG/DL (ref 70–115)
HCT VFR BLD AUTO: 27 % (ref 36–48)
HGB BLD-MCNC: 8.8 G/DL (ref 11.8–16)
IMM GRANULOCYTES # BLD AUTO: 0.01 X10(3)/MCL (ref 0–0.03)
IMM GRANULOCYTES NFR BLD AUTO: 0.2 % (ref 0–0.5)
LIPASE SERPL-CCNC: 31 U/L (ref 23–300)
LYMPHOCYTES # BLD AUTO: 1.35 X10(3)/MCL (ref 1.16–3.74)
LYMPHOCYTES NFR BLD AUTO: 26.8 % (ref 20–55)
MAGNESIUM SERPL-MCNC: 1.4 MG/DL (ref 1.8–2.4)
MCH RBC QN AUTO: 30.8 PG (ref 27–34)
MCHC RBC AUTO-ENTMCNC: 32.6 G/DL (ref 31–37)
MCV RBC AUTO: 94.4 FL (ref 79–99)
MONOCYTES # BLD AUTO: 0.48 X10(3)/MCL (ref 0.24–0.36)
MONOCYTES NFR BLD AUTO: 9.5 % (ref 4.7–12.5)
NEUTROPHILS # BLD AUTO: 3.12 X10(3)/MCL (ref 1.56–6.13)
NEUTROPHILS NFR BLD AUTO: 61.9 % (ref 37–73)
NRBC BLD AUTO-RTO: 0 %
OHS QRS DURATION: 82 MS
OHS QTC CALCULATION: 424 MS
PLATELET # BLD AUTO: 167 X10(3)/MCL (ref 140–371)
PMV BLD AUTO: 11.1 FL (ref 9.4–12.4)
POTASSIUM SERPL-SCNC: 4.3 MMOL/L (ref 3.5–5.1)
PROT SERPL-MCNC: 7.2 GM/DL (ref 6.3–8.2)
RBC # BLD AUTO: 2.86 X10(6)/MCL (ref 4–5.1)
SODIUM SERPL-SCNC: 138 MMOL/L (ref 136–145)
TROPONIN I SERPL-MCNC: <0.012 NG/ML (ref 0–0.03)
WBC # BLD AUTO: 5.04 X10(3)/MCL (ref 4–11.5)

## 2024-07-18 PROCEDURE — 84484 ASSAY OF TROPONIN QUANT: CPT | Performed by: FAMILY MEDICINE

## 2024-07-18 PROCEDURE — 83735 ASSAY OF MAGNESIUM: CPT | Performed by: FAMILY MEDICINE

## 2024-07-18 PROCEDURE — 99285 EMERGENCY DEPT VISIT HI MDM: CPT | Mod: 25

## 2024-07-18 PROCEDURE — 93010 ELECTROCARDIOGRAM REPORT: CPT | Mod: ,,, | Performed by: INTERNAL MEDICINE

## 2024-07-18 PROCEDURE — 85025 COMPLETE CBC W/AUTO DIFF WBC: CPT | Performed by: FAMILY MEDICINE

## 2024-07-18 PROCEDURE — 25000003 PHARM REV CODE 250: Performed by: FAMILY MEDICINE

## 2024-07-18 PROCEDURE — 80053 COMPREHEN METABOLIC PANEL: CPT | Performed by: FAMILY MEDICINE

## 2024-07-18 PROCEDURE — 83690 ASSAY OF LIPASE: CPT | Performed by: FAMILY MEDICINE

## 2024-07-18 PROCEDURE — 76770 US EXAM ABDO BACK WALL COMP: CPT | Mod: TC

## 2024-07-18 PROCEDURE — 93005 ELECTROCARDIOGRAM TRACING: CPT

## 2024-07-18 RX ORDER — LANOLIN ALCOHOL/MO/W.PET/CERES
800 CREAM (GRAM) TOPICAL ONCE
Status: COMPLETED | OUTPATIENT
Start: 2024-07-18 | End: 2024-07-18

## 2024-07-18 RX ORDER — LANOLIN ALCOHOL/MO/W.PET/CERES
400 CREAM (GRAM) TOPICAL 2 TIMES DAILY
Qty: 60 TABLET | Refills: 0 | Status: SHIPPED | OUTPATIENT
Start: 2024-07-18 | End: 2024-08-17

## 2024-07-18 RX ADMIN — Medication 800 MG: at 11:07

## 2024-07-18 NOTE — ED NOTES
Pt verbalized an understanding of discharge instructions, the importance of a follow up appointment, and taking prescriptions as directed.  Both son and pt denied any questions or concerns.

## 2024-07-18 NOTE — ED PROVIDER NOTES
"Encounter Date: 7/18/2024       History     Chief Complaint   Patient presents with    Abdominal Pain     PT c/o upper abdominal pain x2 weeks.      Patient presents with a chief complaint of "funny feeling or pulling in the chest and upper abdomen x2 months.  She reports no fevers chills sweats.  She reports no nausea or vomiting.  She reports no cough or shortness a breath.  She does not report any aggravating or alleviating factors.  It is not associated with exertion.        Review of patient's allergies indicates:   Allergen Reactions    Latex      Past Medical History:   Diagnosis Date    Diabetes mellitus     High cholesterol     History of right breast cancer     History of stomach cancer     Hypertension     Stroke      Past Surgical History:   Procedure Laterality Date    COLON SURGERY      COLONOSCOPY  10/05/2016    MASTECTOMY Right     TOTAL ABDOMINAL HYSTERECTOMY       Family History   Problem Relation Name Age of Onset    Breast cancer Sister      Uterine cancer Neg Hx      Ovarian cancer Neg Hx      Colon cancer Neg Hx       Social History     Tobacco Use    Smoking status: Former     Types: Cigarettes    Smokeless tobacco: Never   Substance Use Topics    Alcohol use: Never    Drug use: Never     Review of Systems   Constitutional: Negative.    Eyes: Negative.    Respiratory: Negative.     Cardiovascular:         "pulling/funny sensation in chest.   Gastrointestinal: Negative.    Musculoskeletal: Negative.        Physical Exam     Initial Vitals [07/18/24 1039]   BP Pulse Resp Temp SpO2   (!) 115/56 78 16 97.8 °F (36.6 °C) 99 %      MAP       --         Physical Exam    Constitutional: She appears well-developed and well-nourished.   Eyes: EOM are normal. Pupils are equal, round, and reactive to light.   Cardiovascular:  Normal rate, regular rhythm and normal heart sounds.           Pulmonary/Chest: Breath sounds normal.   Abdominal: Abdomen is soft. Bowel sounds are normal.     Neurological: She is " alert and oriented to person, place, and time.   Skin: Skin is warm and dry.         ED Course   Procedures  Labs Reviewed   COMPREHENSIVE METABOLIC PANEL - Abnormal; Notable for the following components:       Result Value    Glucose 167 (*)     Blood Urea Nitrogen 43 (*)     Creatinine 2.93 (*)      (*)      (*)     Anion Gap 14.0 (*)     All other components within normal limits   MAGNESIUM - Abnormal; Notable for the following components:    Magnesium Level 1.40 (*)     All other components within normal limits   CBC WITH DIFFERENTIAL - Abnormal; Notable for the following components:    RBC 2.86 (*)     Hgb 8.8 (*)     Hct 27.0 (*)     Mono # 0.48 (*)     All other components within normal limits   LIPASE - Normal   TROPONIN I - Normal   CBC W/ AUTO DIFFERENTIAL    Narrative:     The following orders were created for panel order CBC auto differential.  Procedure                               Abnormality         Status                     ---------                               -----------         ------                     CBC with Differential[[id number]]       Abnormal            Final result                 Please view results for these tests on the individual orders.     EKG Readings: (Independently Interpreted)   Initial Reading: No STEMI. Rhythm: Normal Sinus Rhythm. Heart Rate: 75. Ectopy: No Ectopy. ST Segments: Normal ST Segments. T Waves: Normal.       Imaging Results              CT Abdomen Pelvis  Without Contrast (Final result)  Result time 07/18/24 11:35:08      Final result by Chava Jimenez III, MD (07/18/24 11:35:08)                   Impression:      1. A fairly prominent ventral hernia is present as described above in miscellaneous.  See above comments for details.  2. Chronic changes are present as described above.      Electronically signed by: Chava Jimenez  Date:    07/18/2024  Time:    11:35               Narrative:    EXAMINATION:  CT ABDOMEN PELVIS WITHOUT CONTRAST    CLINICAL  HISTORY AND TECHNIQUE:  Pain    This patient has had 8 CT and nuclear medicine scans performed within the last 12 months.    The following DOSE REDUCTION TECHNIQUES are used for all CT scans at [facility]:    1. Automated exposure control.  2. Adjustment of the mA and/or kv according to patient size.  3. Use of iterative reconstruction technique.    COMPARISON:  06/19/2024    FINDINGS:  Liver: No clinically significant abnormalities are noted.    Gallbladder/biliary system: No clinically significant abnormalities are noted.    Spleen: Several punctate calcifications are noted within the spleen likely related to old granulomatous disease.    Adrenal glands: No clinically significant abnormalities are noted.    Pancreas: The pancreas is markedly atrophic and fatty replaced with no worrisome focal masses or significant inflammatory changes appreciated.    Kidneys/ureters: No clinically significant abnormalities are noted.    Urinary bladder: No clinically significant abnormalities are appreciated.    Uterus and ovaries: The patient is post hysterectomy.    GI tract: Unopacified loops of large and small bowel as well as the gastric lumen are difficult to evaluate.  The appendix is not clearly delineated although there are no secondary changes to suggest appendicitis.    Vascular structures: Fairly prominent atherosclerotic plaquing is noted within the abdominal aorta is primary branches.    Musculoskeletal structures: There is moderate demineralization of the skeletal structures with moderate degenerative changes noted involving the lumbar spine and both hips.    Miscellaneous: A large (at least 10 cm in greatest diameter) ventral hernia with the hernia mouth measuring approximately 5.5 cm in greatest diameter is noted within the midline of the lower abdomen as seen on prior imaging.  The hernia sac contains non incarcerated loops of small bowel.                                       X-Ray Chest AP  Portable (Final result)  Result time 07/18/24 11:10:04      Final result by Chava Jimenez III, MD (07/18/24 11:10:04)                   Impression:      1. I see no lobar or segmental infiltrates or other significant abnormalities.See above comments.      Electronically signed by: Chava Jimenez  Date:    07/18/2024  Time:    11:10               Narrative:    EXAMINATION:  STUDY: XR CHEST AP PORTABLE    CLINICAL HISTORY AND TECHNIQUE:  Chest pain    COMPARISON:  06/07/2024    FINDINGS:  The cardiac, hilar, and mediastinal contours appear unremarkable.I see no lobar or segmental infiltrates.No significant pleural effusions are noted.There is moderate demineralization of the skeletal structures with mild degenerative changes noted involving the thoracic spine and fairly prominent degenerative changes noted involving the left acromioclavicular and glenohumeral joints.  Surgical clips are noted within the right breast and axilla.                                       Medications   magnesium oxide tablet 800 mg (800 mg Oral Given 7/18/24 1153)     Medical Decision Making  Amount and/or Complexity of Data Reviewed  Labs: ordered.  Radiology: ordered.    Risk  OTC drugs.      Additional MDM:   Differential Diagnosis:   Cardiac arrhythmia, STEMI, non STEMI, pneumonia, reflux, ventral hernia                                    Clinical Impression:  Final diagnoses:  [R10.13] Epigastric pain  [R07.9] Chest pain  [K43.9] Ventral hernia without obstruction or gangrene (Primary)          ED Disposition Condition    Discharge Stable          ED Prescriptions    None       Follow-up Information       Follow up With Specialties Details Why Contact Info    Sylvain Ackerman MD Internal Medicine, General Practice In 2 days  [address]  [address] [facility] [address]  [phone]               Clifton Walker MD  07/18/24 6292

## 2024-07-18 NOTE — ED NOTES
Spoke with pt's son, Jah, stated will  pt.  Verbalized an understanding of discharge and discharge instructions.

## 2024-07-27 ENCOUNTER — HOSPITAL ENCOUNTER (EMERGENCY)
Facility: HOSPITAL | Age: 79
Discharge: HOME | End: 2024-07-27
Attending: FAMILY MEDICINE
Payer: MEDICARE

## 2024-07-27 VITALS
TEMPERATURE: 98 F | WEIGHT: 154.19 LBS | SYSTOLIC BLOOD PRESSURE: 119 MMHG | DIASTOLIC BLOOD PRESSURE: 54 MMHG | RESPIRATION RATE: 18 BRPM | OXYGEN SATURATION: 99 % | HEART RATE: 81 BPM | BODY MASS INDEX: 24.78 KG/M2 | HEIGHT: 66 IN

## 2024-07-27 DIAGNOSIS — M25.561 ACUTE PAIN OF RIGHT KNEE: Primary | ICD-10-CM

## 2024-07-27 DIAGNOSIS — R55 SYNCOPE: ICD-10-CM

## 2024-07-27 DIAGNOSIS — N18.30 CHRONIC RENAL INSUFFICIENCY, STAGE 3 (MODERATE): ICD-10-CM
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
## 2024-07-27 DIAGNOSIS — D64.9 ANEMIA, UNSPECIFIED TYPE: ICD-10-CM

## 2024-07-27 DIAGNOSIS — E83.42 HYPOMAGNESEMIA: ICD-10-CM

## 2024-07-27 LAB
ALBUMIN SERPL-MCNC: 3.7 G/DL (ref 3.4–5)
ALBUMIN/GLOB SERPL: 1.2 RATIO
ALP SERPL-CCNC: 85 UNIT/L (ref 50–144)
ALT SERPL-CCNC: 62 UNIT/L (ref 1–45)
ANION GAP SERPL CALC-SCNC: 14 MEQ/L (ref 2–13)
AST SERPL-CCNC: 78 UNIT/L (ref 14–36)
BACTERIA #/AREA URNS AUTO: ABNORMAL /HPF
BASOPHILS # BLD AUTO: 0.03 X10(3)/MCL (ref 0.01–0.08)
BASOPHILS NFR BLD AUTO: 0.6 % (ref 0.1–1.2)
BILIRUB SERPL-MCNC: 0.5 MG/DL (ref 0–1)
BILIRUB UR QL STRIP.AUTO: NEGATIVE
BUN SERPL-MCNC: 51 MG/DL (ref 7–20)
CALCIUM SERPL-MCNC: 8.5 MG/DL (ref 8.4–10.2)
CHLORIDE SERPL-SCNC: 101 MMOL/L (ref 98–110)
CLARITY UR: CLEAR
CO2 SERPL-SCNC: 21 MMOL/L (ref 21–32)
COLOR UR AUTO: YELLOW
CREAT SERPL-MCNC: 2.93 MG/DL (ref 0.66–1.25)
CREAT/UREA NIT SERPL: 17 (ref 12–20)
EOSINOPHIL # BLD AUTO: 0.06 X10(3)/MCL (ref 0.04–0.36)
EOSINOPHIL NFR BLD AUTO: 1.1 % (ref 0.7–7)
ERYTHROCYTE [DISTWIDTH] IN BLOOD BY AUTOMATED COUNT: 13.9 % (ref 11–14.5)
GFR SERPLBLD CREATININE-BSD FMLA CKD-EPI: 16 ML/MIN/1.73/M2
GLOBULIN SER-MCNC: 3.2 GM/DL (ref 2–3.9)
GLUCOSE SERPL-MCNC: 161 MG/DL (ref 70–115)
GLUCOSE UR QL STRIP: NEGATIVE
HCT VFR BLD AUTO: 26.7 % (ref 36–48)
HGB BLD-MCNC: 8.5 G/DL (ref 11.8–16)
HGB UR QL STRIP: ABNORMAL
IMM GRANULOCYTES # BLD AUTO: 0.02 X10(3)/MCL (ref 0–0.03)
IMM GRANULOCYTES NFR BLD AUTO: 0.4 % (ref 0–0.5)
KETONES UR QL STRIP: NEGATIVE
LEUKOCYTE ESTERASE UR QL STRIP: NEGATIVE
LYMPHOCYTES # BLD AUTO: 1.29 X10(3)/MCL (ref 1.16–3.74)
LYMPHOCYTES NFR BLD AUTO: 23.8 % (ref 20–55)
MAGNESIUM SERPL-MCNC: 1.4 MG/DL (ref 1.8–2.4)
MCH RBC QN AUTO: 30.7 PG (ref 27–34)
MCHC RBC AUTO-ENTMCNC: 31.8 G/DL (ref 31–37)
MCV RBC AUTO: 96.4 FL (ref 79–99)
MONOCYTES # BLD AUTO: 0.56 X10(3)/MCL (ref 0.24–0.36)
MONOCYTES NFR BLD AUTO: 10.3 % (ref 4.7–12.5)
NEUTROPHILS # BLD AUTO: 3.46 X10(3)/MCL (ref 1.56–6.13)
NEUTROPHILS NFR BLD AUTO: 63.8 % (ref 37–73)
NITRITE UR QL STRIP: NEGATIVE
NRBC BLD AUTO-RTO: 0 %
PH UR STRIP: 6 [PH]
PLATELET # BLD AUTO: 156 X10(3)/MCL (ref 140–371)
PMV BLD AUTO: 11 FL (ref 9.4–12.4)
POTASSIUM SERPL-SCNC: 3.7 MMOL/L (ref 3.5–5.1)
PROT SERPL-MCNC: 6.9 GM/DL (ref 6.3–8.2)
PROT UR QL STRIP: 30
RBC # BLD AUTO: 2.77 X10(6)/MCL (ref 4–5.1)
RBC #/AREA URNS AUTO: ABNORMAL /HPF
SODIUM SERPL-SCNC: 136 MMOL/L (ref 136–145)
SP GR UR STRIP.AUTO: 1.02 (ref 1–1.03)
SQUAMOUS #/AREA URNS AUTO: ABNORMAL /HPF
TROPONIN I SERPL-MCNC: <0.012 NG/ML (ref 0–0.03)
UROBILINOGEN UR STRIP-ACNC: 0.2
WBC # BLD AUTO: 5.42 X10(3)/MCL (ref 4–11.5)
WBC #/AREA URNS AUTO: ABNORMAL /HPF

## 2024-07-27 PROCEDURE — 25000003 PHARM REV CODE 250: Performed by: FAMILY MEDICINE

## 2024-07-27 PROCEDURE — 80053 COMPREHEN METABOLIC PANEL: CPT | Performed by: FAMILY MEDICINE

## 2024-07-27 PROCEDURE — 83735 ASSAY OF MAGNESIUM: CPT | Performed by: FAMILY MEDICINE

## 2024-07-27 PROCEDURE — 87086 URINE CULTURE/COLONY COUNT: CPT | Performed by: FAMILY MEDICINE

## 2024-07-27 PROCEDURE — 93010 ELECTROCARDIOGRAM REPORT: CPT | Mod: ,,, | Performed by: INTERNAL MEDICINE

## 2024-07-27 PROCEDURE — 85025 COMPLETE CBC W/AUTO DIFF WBC: CPT | Performed by: FAMILY MEDICINE

## 2024-07-27 PROCEDURE — 84484 ASSAY OF TROPONIN QUANT: CPT | Performed by: FAMILY MEDICINE

## 2024-07-27 PROCEDURE — 99285 EMERGENCY DEPT VISIT HI MDM: CPT | Mod: 25

## 2024-07-27 PROCEDURE — 93005 ELECTROCARDIOGRAM TRACING: CPT

## 2024-07-27 PROCEDURE — 63600175 PHARM REV CODE 636 W HCPCS: Performed by: FAMILY MEDICINE

## 2024-07-27 PROCEDURE — 96366 THER/PROPH/DIAG IV INF ADDON: CPT

## 2024-07-27 PROCEDURE — 81015 MICROSCOPIC EXAM OF URINE: CPT | Performed by: FAMILY MEDICINE

## 2024-07-27 PROCEDURE — 81003 URINALYSIS AUTO W/O SCOPE: CPT | Performed by: FAMILY MEDICINE

## 2024-07-27 PROCEDURE — 96365 THER/PROPH/DIAG IV INF INIT: CPT

## 2024-07-27 RX ORDER — MAGNESIUM SULFATE HEPTAHYDRATE 40 MG/ML
2 INJECTION, SOLUTION INTRAVENOUS
Status: COMPLETED | OUTPATIENT
Start: 2024-07-27 | End: 2024-07-27

## 2024-07-27 RX ORDER — SODIUM CHLORIDE 9 MG/ML
1000 INJECTION, SOLUTION INTRAVENOUS
Status: COMPLETED | OUTPATIENT
Start: 2024-07-27 | End: 2024-07-27

## 2024-07-27 RX ORDER — LORAZEPAM 2 MG/ML
1 INJECTION INTRAMUSCULAR
Status: DISCONTINUED | OUTPATIENT
Start: 2024-07-27 | End: 2024-07-27

## 2024-07-27 RX ORDER — FENTANYL CITRATE 50 UG/ML
100 INJECTION, SOLUTION INTRAMUSCULAR; INTRAVENOUS
Status: DISCONTINUED | OUTPATIENT
Start: 2024-07-27 | End: 2024-07-27

## 2024-07-27 RX ADMIN — SODIUM CHLORIDE 1000 ML: 9 INJECTION, SOLUTION INTRAVENOUS at 03:07

## 2024-07-27 RX ADMIN — MAGNESIUM SULFATE HEPTAHYDRATE 2 G: 40 INJECTION, SOLUTION INTRAVENOUS at 03:07

## 2024-07-27 NOTE — ED PROVIDER NOTES
"Encounter Date: 7/27/2024       History     Chief Complaint   Patient presents with    Pre Syncope     PRESENTS TO ED PER [facility] FROM HOME WITH C/O SYNCOPAL EPISODE ON TOILET. PATIENT STATES "I WAS TRYING TO URINATE AND JUST PASSED". DENIES FALL OR HEAD INJURY.     Patient presents for evaluation of fall after using the bathroom.  Patient notes losing consciousness briefly I have while urinating prior to arrival.  Patient notes symptoms resolved completely but has had previous episodes in the past at present emergency room patient is without pain aside from right knee which she claims she hit when she fell.  Right knee pain is aching moderate pain that is worse with ambulation and pressure to the area immobility helps relieve pain.  Patient denies having any other associated symptoms at present.    The history is provided by the patient.     Review of patient's allergies indicates:   Allergen Reactions    Latex      Past Medical History:   Diagnosis Date    Diabetes mellitus     High cholesterol     History of right breast cancer     History of stomach cancer     Hypertension     Stroke      Past Surgical History:   Procedure Laterality Date    COLON SURGERY      COLONOSCOPY  10/05/2016    MASTECTOMY Right     TOTAL ABDOMINAL HYSTERECTOMY       Family History   Problem Relation Name Age of Onset    Breast cancer Sister      Uterine cancer Neg Hx      Ovarian cancer Neg Hx      Colon cancer Neg Hx       Social History     Tobacco Use    Smoking status: Former     Types: Cigarettes    Smokeless tobacco: Never   Substance Use Topics    Alcohol use: Never    Drug use: Never     Review of Systems   Constitutional: Negative.    HENT: Negative.     Eyes: Negative.    Respiratory: Negative.     Cardiovascular: Negative.    Gastrointestinal:  Negative for rectal pain.   Endocrine: Negative.    Genitourinary: Negative.    Musculoskeletal: Negative.         Right knee pain.   Skin: Negative.    Allergic/Immunologic: " Negative.    Neurological: Negative.    Hematological: Negative.    Psychiatric/Behavioral: Negative.         Physical Exam     Initial Vitals [07/27/24 1427]   BP Pulse Resp Temp SpO2   (!) 110/59 77 18 98.4 °F (36.9 °C) 100 %      MAP       --         Physical Exam    Vitals reviewed.  Constitutional: She appears well-developed and well-nourished. She is not diaphoretic. No distress.   HENT:   Head: Normocephalic and atraumatic.   Mouth/Throat: No oropharyngeal exudate.   Eyes: EOM are normal. Pupils are equal, round, and reactive to light. Right eye exhibits no discharge. Left eye exhibits no discharge. No scleral icterus.   Neck: Neck supple. No thyromegaly present. No tracheal deviation present. No JVD present.   Normal range of motion.  Cardiovascular:  Normal rate and regular rhythm.     Exam reveals no gallop and no friction rub.       No murmur heard.  Pulmonary/Chest: Breath sounds normal. No stridor. No respiratory distress. She has no wheezes. She has no rhonchi. She has no rales. She exhibits no tenderness.   Abdominal: Abdomen is soft. Bowel sounds are normal. She exhibits no distension and no mass. There is no abdominal tenderness. There is no rebound and no guarding.   Musculoskeletal:         General: Normal range of motion.      Cervical back: Normal range of motion and neck supple.     Lymphadenopathy:     She has no cervical adenopathy.   Neurological: She is alert and oriented to person, place, and time. She has normal reflexes. She displays normal reflexes. No cranial nerve deficit or sensory deficit. GCS score is 15. GCS eye subscore is 4. GCS verbal subscore is 5. GCS motor subscore is 6.   Skin: Skin is warm. Capillary refill takes less than 2 seconds. No rash noted. No erythema.   Psychiatric: She has a normal mood and affect.         ED Course   Procedures  Labs Reviewed   COMPREHENSIVE METABOLIC PANEL - Abnormal       Result Value    Sodium 136      Potassium 3.7      Chloride 101       CO2 21      Glucose 161 (*)     Blood Urea Nitrogen 51 (*)     Creatinine 2.93 (*)     Calcium 8.5      Protein Total 6.9      Albumin 3.7      Globulin 3.2      Albumin/Globulin Ratio 1.2      Bilirubin Total 0.5      ALP 85      ALT 62 (*)     AST 78 (*)     eGFR 16      Anion Gap 14.0 (*)     BUN/Creatinine Ratio 17     MAGNESIUM - Abnormal    Magnesium Level 1.40 (*)    CBC WITH DIFFERENTIAL - Abnormal    WBC 5.42      RBC 2.77 (*)     Hgb 8.5 (*)     Hct 26.7 (*)     MCV 96.4      MCH 30.7      MCHC 31.8      RDW 13.9      Platelet 156      MPV 11.0      Neut % 63.8      Lymph % 23.8      Mono % 10.3      Eos % 1.1      Basophil % 0.6      Lymph # 1.29      Neut # 3.46      Mono # 0.56 (*)     Eos # 0.06      Baso # 0.03      IG# 0.02      IG% 0.4      NRBC% 0.0     URINALYSIS, REFLEX TO URINE CULTURE - Abnormal    Color, UA Yellow      Appearance, UA Clear      Specific Gravity, UA 1.020      pH, UA 6.0      Protein, UA 30 (*)     Glucose, UA Negative      Ketones, UA Negative      Blood, UA Trace-Intact (*)     Bilirubin, UA Negative      Urobilinogen, UA 0.2      Nitrites, UA Negative      Leukocyte Esterase, UA Negative      Narrative:      URINE STABILITY IS 2 HOURS AT ROOM TEMP OR    SIX HOURS REFRIGERATED. PERFORMING TESTING ON    SPECIMENS GREATER THAN THIS AGE MAY AFFECT THE    FOLLOWING TESTS:    PH          SPECIFIC GRAVITY           BLOOD    CLARITY     BILIRUBIN               UROBILINOGEN   URINALYSIS, MICROSCOPIC - Abnormal    Bacteria, UA Moderate (*)     RBC, UA 0-2      WBC, UA 6-10 (*)     Squamous Epithelial Cells, UA Few (*)    TROPONIN I - Normal    Troponin-I <0.012     CULTURE, URINE   CBC W/ AUTO DIFFERENTIAL    Narrative:     The following orders were created for panel order CBC auto differential.  Procedure                               Abnormality         Status                     ---------                               -----------         ------                     CBC with  Differential[[id number]]       Abnormal            Final result                 Please view results for these tests on the individual orders.     EKG Readings: (Independently Interpreted)   Initial Reading: No STEMI. Rhythm: Normal Sinus Rhythm. Ectopy: No Ectopy. ST Segments: Normal ST Segments. T Waves: Normal.   Normal sinus rhythm 77 beats per minute large degree of baseline artifact present.  Otherwise normal EKG       Imaging Results              X-Ray Knee 3 View Right (Final result)  Result time 07/27/24 16:11:23      Final result by Mat Gomez Jr., MD (07/27/24 16:11:23)                   Impression:      1. Progressive osseous sclerosis about the femoral condyles and tibial plateau.  Advanced degenerative joint disease present.      Electronically signed by: Mat Gomez MD  Date:    07/27/2024  Time:    16:11               Narrative:    EXAMINATION:  XR KNEE 3 VIEW RIGHT    CLINICAL HISTORY:  Unspecified fall, initial encounter    TECHNIQUE:  AP, lateral, and Merchant views of the right knee were performed.    COMPARISON:  09/13/2017    FINDINGS:  There is diffuse osseous sclerosis about the knee joint.  Degenerative changes are present.  Advanced atherosclerotic disease is noted.  No acute displaced fracture identified.  There is soft tissue swelling present.                                       CT Chest Without Contrast (Final result)  Result time 07/27/24 16:10:32      Final result by Mat Gomez Jr., MD (07/27/24 16:10:32)                   Impression:      1. No acute abnormality of the lung parenchyma on noncontrast chest CT.      Electronically signed by: Mat Gomez MD  Date:    07/27/2024  Time:    16:10               Narrative:    EXAMINATION:  CT CHEST WITHOUT CONTRAST    CLINICAL HISTORY:  Syncope;    TECHNIQUE:  Axial CT images were obtained through the chest without contrast.    COMPARISON:  None.    FINDINGS:  The ascending aorta is heavily  calcified.  The main pulmonary artery is normal in size.  Trachea is deviated to the right, age indeterminate.  There is air within the esophagus.  Scattered non pathologically enlarged mediastinal and hilar lymph nodes are present.    A review of the lung windows demonstrates emphysematous change in the apices with parenchymal scarring and fibrotic change at the bases.  There is calcified granuloma within the right lower lobe.  There is posterior dependent atelectasis.    A review of the bone windows demonstrates diffuse advanced degenerative change about the thoracic spine.  Several foci of sclerosis are noted about the thoracic spine.  Metastatic disease could have this appearance.  Further assessment with PET CT recommended.                                       CT Head Without Contrast (Final result)  Result time 07/27/24 16:07:29      Final result by Mat Gomez Jr., MD (07/27/24 16:07:29)                   Impression:      1. Punctate low-attenuation foci about the supratentorial brain, nonspecific in appearance.  Differential diagnosis would include the possibility of intracranial metastasis.  Further assessment with contrast enhanced CT recommended.      Electronically signed by: Mat Gomez MD  Date:    07/27/2024  Time:    16:07               Narrative:    EXAMINATION:  CT HEAD WITHOUT CONTRAST    CLINICAL HISTORY:  Syncope; altered mental status, neurologic deficit    TECHNIQUE:  Low dose axial images were obtained through the head.  Coronal and sagittal reformations were also performed. Contrast was not administered.    COMPARISON:  06/07/2024    FINDINGS:  There is diffuse cerebral atrophy both centrally and over the convexities without midline shift or mass effect.    Progressive remote ischemic changes are present about the periventricular white matter, basal ganglia, corona radiata, brainstem.    On today's examination, focal areas of low attenuation about the right and left  cerebral hemisphere are noted.  Subacute infarction, or intracranial mass lesions in the differential.  Consider further assessment with contrast enhanced CT scan of the brain or MRI.    The calvarium is otherwise intact.  There is mucosal thickening of the paranasal sinuses.    There is no intracranial hemorrhage.    There are postoperative changes about the mandible incompletely visualized on the examination.                                       Medications   magnesium sulfate 2g in water 50mL IVPB (premix) (2 g Intravenous New Bag 7/27/24 1549)   0.9%  NaCl infusion (1,000 mLs Intravenous New Bag 7/27/24 1549)     Medical Decision Making  Amount and/or Complexity of Data Reviewed  Labs: ordered.  Radiology: ordered.    Risk  Prescription drug management.                                      Clinical Impression:  Final diagnoses:  [R55] Syncope  [W19.XXXA] Fall  [M25.561] Acute pain of right knee (Primary)  [D64.9] Anemia, unspecified type  [N18.30] Chronic renal insufficiency, stage 3 (moderate)  [E83.42] Hypomagnesemia                 Ye Lobo MD  07/29/24 1225

## 2024-07-29 LAB
OHS QRS DURATION: 74 MS
OHS QTC CALCULATION: 393 MS

## 2024-07-30 LAB — BACTERIA UR CULT: NORMAL

## 2024-08-05 ENCOUNTER — HOSPITAL ENCOUNTER (OUTPATIENT)
Dept: RADIOLOGY | Facility: HOSPITAL | Age: 79
Discharge: HOME | End: 2024-08-05
Attending: INTERNAL MEDICINE
Payer: MEDICARE

## 2024-08-05 DIAGNOSIS — R52 PAIN: ICD-10-CM

## 2024-08-05 PROCEDURE — 70551 MRI BRAIN STEM W/O DYE: CPT | Mod: TC

## 2024-08-06 ENCOUNTER — LAB REQUISITION (OUTPATIENT)
Dept: LAB | Facility: HOSPITAL | Age: 79
End: 2024-08-06
Payer: MEDICARE

## 2024-08-06 DIAGNOSIS — E11.42 TYPE 2 DIABETES MELLITUS WITH DIABETIC POLYNEUROPATHY: ICD-10-CM

## 2024-08-06 DIAGNOSIS — E86.0 DEHYDRATION: ICD-10-CM

## 2024-08-06 DIAGNOSIS — D63.1 ANEMIA IN CHRONIC KIDNEY DISEASE (CODE): ICD-10-CM

## 2024-08-06 DIAGNOSIS — I12.9 HYPERTENSIVE CHRONIC KIDNEY DISEASE WITH STAGE 1 THROUGH STAGE 4 CHRONIC KIDNEY DISEASE, OR UNSPECIFIED CHRONIC KIDNEY DISEASE: ICD-10-CM

## 2024-08-06 LAB
ALBUMIN SERPL-MCNC: 3.3 G/DL (ref 3.4–5)
ALBUMIN/GLOB SERPL: 1.1 RATIO
ALP SERPL-CCNC: 84 UNIT/L (ref 50–144)
ALT SERPL-CCNC: 78 UNIT/L (ref 1–45)
ANION GAP SERPL CALC-SCNC: 12 MEQ/L (ref 2–13)
AST SERPL-CCNC: 180 UNIT/L (ref 14–36)
BASOPHILS # BLD AUTO: 0.01 X10(3)/MCL (ref 0.01–0.08)
BASOPHILS NFR BLD AUTO: 0.2 % (ref 0.1–1.2)
BILIRUB SERPL-MCNC: 0.5 MG/DL (ref 0–1)
BUN SERPL-MCNC: 38 MG/DL (ref 7–20)
CALCIUM SERPL-MCNC: 8.3 MG/DL (ref 8.4–10.2)
CHLORIDE SERPL-SCNC: 102 MMOL/L (ref 98–110)
CO2 SERPL-SCNC: 23 MMOL/L (ref 21–32)
CREAT SERPL-MCNC: 2.75 MG/DL (ref 0.66–1.25)
CREAT/UREA NIT SERPL: 14 (ref 12–20)
EOSINOPHIL # BLD AUTO: 0.03 X10(3)/MCL (ref 0.04–0.36)
EOSINOPHIL NFR BLD AUTO: 0.6 % (ref 0.7–7)
ERYTHROCYTE [DISTWIDTH] IN BLOOD BY AUTOMATED COUNT: 14.1 % (ref 11–14.5)
GFR SERPLBLD CREATININE-BSD FMLA CKD-EPI: 17 ML/MIN/1.73/M2
GLOBULIN SER-MCNC: 3 GM/DL (ref 2–3.9)
GLUCOSE SERPL-MCNC: 178 MG/DL (ref 70–115)
HCT VFR BLD AUTO: 25.3 % (ref 36–48)
HGB BLD-MCNC: 7.7 G/DL (ref 11.8–16)
IMM GRANULOCYTES # BLD AUTO: 0.01 X10(3)/MCL (ref 0–0.03)
IMM GRANULOCYTES NFR BLD AUTO: 0.2 % (ref 0–0.5)
LYMPHOCYTES # BLD AUTO: 1.01 X10(3)/MCL (ref 1.16–3.74)
LYMPHOCYTES NFR BLD AUTO: 18.6 % (ref 20–55)
MCH RBC QN AUTO: 30.2 PG (ref 27–34)
MCHC RBC AUTO-ENTMCNC: 30.4 G/DL (ref 31–37)
MCV RBC AUTO: 99.2 FL (ref 79–99)
MONOCYTES # BLD AUTO: 0.57 X10(3)/MCL (ref 0.24–0.36)
MONOCYTES NFR BLD AUTO: 10.5 % (ref 4.7–12.5)
NEUTROPHILS # BLD AUTO: 3.8 X10(3)/MCL (ref 1.56–6.13)
NEUTROPHILS NFR BLD AUTO: 69.9 % (ref 37–73)
PLATELET # BLD AUTO: 175 X10(3)/MCL (ref 140–371)
PMV BLD AUTO: 10.5 FL (ref 9.4–12.4)
POTASSIUM SERPL-SCNC: 3.5 MMOL/L (ref 3.5–5.1)
PROT SERPL-MCNC: 6.3 GM/DL (ref 6.3–8.2)
RBC # BLD AUTO: 2.55 X10(6)/MCL (ref 4–5.1)
SODIUM SERPL-SCNC: 137 MMOL/L (ref 136–145)
WBC # BLD AUTO: 5.43 X10(3)/MCL (ref 4–11.5)

## 2024-08-06 PROCEDURE — 85025 COMPLETE CBC W/AUTO DIFF WBC: CPT | Performed by: INTERNAL MEDICINE

## 2024-08-06 PROCEDURE — 80053 COMPREHEN METABOLIC PANEL: CPT | Performed by: INTERNAL MEDICINE
